# Patient Record
Sex: MALE | Race: WHITE | NOT HISPANIC OR LATINO | Employment: OTHER | URBAN - METROPOLITAN AREA
[De-identification: names, ages, dates, MRNs, and addresses within clinical notes are randomized per-mention and may not be internally consistent; named-entity substitution may affect disease eponyms.]

---

## 2019-02-13 ENCOUNTER — TRANSCRIBE ORDERS (OUTPATIENT)
Dept: ADMINISTRATIVE | Facility: HOSPITAL | Age: 39
End: 2019-02-13

## 2019-02-13 DIAGNOSIS — R74.8 ACID PHOSPHATASE ELEVATED: Primary | ICD-10-CM

## 2019-02-15 ENCOUNTER — HOSPITAL ENCOUNTER (OUTPATIENT)
Dept: ULTRASOUND IMAGING | Facility: HOSPITAL | Age: 39
Discharge: HOME | End: 2019-02-15
Admitting: INTERNAL MEDICINE

## 2019-02-15 DIAGNOSIS — R74.8 ACID PHOSPHATASE ELEVATED: ICD-10-CM

## 2019-02-15 PROCEDURE — 76705 ECHO EXAM OF ABDOMEN: CPT

## 2019-03-11 ENCOUNTER — TRANSCRIBE ORDERS (OUTPATIENT)
Dept: ADMINISTRATIVE | Facility: HOSPITAL | Age: 39
End: 2019-03-11

## 2019-03-11 DIAGNOSIS — R74.8 ELEVATED LIVER ENZYMES: Primary | ICD-10-CM

## 2019-04-29 ENCOUNTER — TELEPHONE (OUTPATIENT)
Dept: SURGERY | Facility: CLINIC | Age: 39
End: 2019-04-29

## 2019-05-02 NOTE — TELEPHONE ENCOUNTER
Help me with this one... 37 yo with heartburn for a colonsocopy no family history, no other notes I could find

## 2019-05-06 NOTE — TELEPHONE ENCOUNTER
PATIENT WAS ACTUALLY REFERRED TO US BY DR WEISS FOR EGD & C/S FOR IRON DEF ANEMIA & REFLUX. I HOPE THIS HELPS - THANKS

## 2019-05-09 ENCOUNTER — PREP FOR SURGERY (OUTPATIENT)
Dept: OTHER | Facility: HOSPITAL | Age: 39
End: 2019-05-09

## 2019-05-09 DIAGNOSIS — D50.9 IRON DEFICIENCY ANEMIA, UNSPECIFIED IRON DEFICIENCY ANEMIA TYPE: Primary | ICD-10-CM

## 2019-05-09 NOTE — TELEPHONE ENCOUNTER
Thanks that helps I scheduled in BLOU for possible APC just incase he has GAVE    Double / BLOU / ABENA

## 2019-05-13 ENCOUNTER — PREP FOR SURGERY (OUTPATIENT)
Dept: OTHER | Facility: HOSPITAL | Age: 39
End: 2019-05-13

## 2019-05-13 DIAGNOSIS — D50.9 IRON DEFICIENCY ANEMIA, UNSPECIFIED IRON DEFICIENCY ANEMIA TYPE: Primary | ICD-10-CM

## 2019-05-13 NOTE — TELEPHONE ENCOUNTER
RETURN CALL FROM PATIENT.  SCHEDULED [facility] 06/10/2019 AT 11:30AM - ARRIVE 10:30AM.  GOING TO CHECK WITH HIS WIFE AND CALL US BACK.

## 2019-06-10 ENCOUNTER — LAB REQUISITION (OUTPATIENT)
Dept: LAB | Facility: HOSPITAL | Age: 39
End: 2019-06-10

## 2019-06-10 ENCOUNTER — OUTSIDE FACILITY SERVICE (OUTPATIENT)
Dept: GASTROENTEROLOGY | Facility: CLINIC | Age: 39
End: 2019-06-10

## 2019-06-10 DIAGNOSIS — D50.9 IRON DEFICIENCY ANEMIA: ICD-10-CM

## 2019-06-10 PROCEDURE — 88305 TISSUE EXAM BY PATHOLOGIST: CPT | Performed by: INTERNAL MEDICINE

## 2019-06-10 PROCEDURE — 88313 SPECIAL STAINS GROUP 2: CPT | Performed by: INTERNAL MEDICINE

## 2019-06-10 PROCEDURE — 45380 COLONOSCOPY AND BIOPSY: CPT | Performed by: INTERNAL MEDICINE

## 2019-06-10 PROCEDURE — 43239 EGD BIOPSY SINGLE/MULTIPLE: CPT | Performed by: INTERNAL MEDICINE

## 2019-06-12 LAB
LAB AP CASE REPORT: NORMAL
LAB AP CLINICAL INFORMATION: NORMAL
LAB AP DIAGNOSIS COMMENT: NORMAL
PATH REPORT.ADDENDUM SPEC: NORMAL
PATH REPORT.FINAL DX SPEC: NORMAL
PATH REPORT.GROSS SPEC: NORMAL

## 2019-08-22 ENCOUNTER — OFFICE VISIT (OUTPATIENT)
Dept: GASTROENTEROLOGY | Facility: CLINIC | Age: 39
End: 2019-08-22

## 2019-08-22 VITALS — HEIGHT: 69 IN | WEIGHT: 190.2 LBS | BODY MASS INDEX: 28.17 KG/M2

## 2019-08-22 DIAGNOSIS — Z86.010 PERSONAL HISTORY OF COLONIC POLYPS: ICD-10-CM

## 2019-08-22 DIAGNOSIS — K21.00 GASTROESOPHAGEAL REFLUX DISEASE WITH ESOPHAGITIS: Primary | ICD-10-CM

## 2019-08-22 DIAGNOSIS — K75.81 NASH (NONALCOHOLIC STEATOHEPATITIS): ICD-10-CM

## 2019-08-22 PROCEDURE — 99213 OFFICE O/P EST LOW 20 MIN: CPT | Performed by: INTERNAL MEDICINE

## 2019-08-22 RX ORDER — CHOLECALCIFEROL (VITAMIN D3) 125 MCG
CAPSULE ORAL
COMMUNITY

## 2019-08-22 RX ORDER — PNV NO.95/FERROUS FUM/FOLIC AC 28MG-0.8MG
TABLET ORAL
COMMUNITY
End: 2021-07-28

## 2019-08-22 NOTE — PROGRESS NOTES
PATIENT INFORMATION  Iván Rankin       - 1980    CHIEF COMPLAINT  Chief Complaint   Patient presents with   • Follow-up     2 mo follow up on GERD       HISTORY OF PRESENT ILLNESS  Here for follow u of ABENA and has controlled his symptoms well and his colon did have one TA so will recheck in 5 years    Also has a history of OLIVA and no labs form r Idsgteins so will call.    Is on iron and he takes TIW and BID            REVIEW OF SYSTEMS  Review of Systems   Gastrointestinal:        Reflux   Allergic/Immunologic: Positive for environmental allergies.   All other systems reviewed and are negative.        ACTIVE PROBLEMS  There are no active problems to display for this patient.        PAST MEDICAL HISTORY  Past Medical History:   Diagnosis Date   • Colon polyp    • GERD (gastroesophageal reflux disease)    • Seasonal allergies          SURGICAL HISTORY  Past Surgical History:   Procedure Laterality Date   • COLONOSCOPY     • ENDOSCOPY     • TONSILLECTOMY     • UPPER GASTROINTESTINAL ENDOSCOPY           FAMILY HISTORY  Family History   Problem Relation Age of Onset   • Autoimmune disease Mother    • Fibromyalgia Mother    • Heart attack Father    • Thyroid cancer Maternal Grandfather    • Colon cancer Neg Hx    • Colon polyps Neg Hx    • Esophageal cancer Neg Hx          SOCIAL HISTORY  Social History     Occupational History   • Not on file   Tobacco Use   • Smoking status: Never Smoker   • Smokeless tobacco: Never Used   Substance and Sexual Activity   • Alcohol use: Yes     Comment: rarely   • Drug use: No   • Sexual activity: Defer       Debilities/Disabilities Identified: None    Emotional Behavior: Appropriate    CURRENT MEDICATIONS    Current Outpatient Medications:   •  Ferrous Sulfate (IRON) 325 (65 Fe) MG tablet, , Disp: , Rfl:   •  Lactobacillus (PROBIOTIC ACIDOPHILUS) capsule, , Disp: , Rfl:   •  loratadine (CLARITIN) 10 MG tablet, Take 10 mg by mouth daily., Disp: , Rfl:   •  Multiple  "Vitamins-Minerals (MULTIVITAL PO), Take  by mouth., Disp: , Rfl:   •  omeprazole (PriLOSEC) 40 MG capsule, Take 40 mg by mouth daily., Disp: , Rfl:     ALLERGIES  Patient has no known allergies.    VITALS  Vitals:    08/22/19 0943   Weight: 86.3 kg (190 lb 3.2 oz)   Height: 175.3 cm (69\")       LAST RESULTS   Lab Requisition on 06/10/2019   Component Date Value Ref Range Status   • Addendum 06/10/2019    Final                    Value:This result contains rich text formatting which cannot be displayed here.   • Case Report 06/10/2019    Final                    Value:Surgical Pathology Report                         Case: [id number]                                  Authorizing Provider:  Paco Nunes        Collected:           06/10/2019 11:30 AM                                 MD Eddy                                                                   Pathologist:           Remberto Sethi MD       Received:            06/10/2019 03:39 PM          Specimens:   1) - Small Intestine, 1) duodenal/ small bowel bx                                                   2) - Gastric, 2) gastric bx                                                                         3) - Esophagus, 3) esophageal bx                                                                    4) - Large Intestine, Right / Ascending Colon, 4) ascending colon polyp x2                          5) - Large Intestine, Rectum, 5) rectal polyp x2                                          • Clinical Information 06/10/2019    Final                    Value:This result contains rich text formatting which cannot be displayed here.   • Final Diagnosis 06/10/2019    Final                    Value:This result contains rich text formatting which cannot be displayed here.   • Comment 06/10/2019    Final                    Value:This result contains rich text formatting which cannot be displayed here.   • Gross Description 06/10/2019    Final                    " Value:This result contains rich text formatting which cannot be displayed here.     No results found.    PHYSICAL EXAM  Physical Exam   Constitutional: He is oriented to person, place, and time. He appears well-developed and well-nourished.   HENT:   Head: Normocephalic and atraumatic.   Eyes: Conjunctivae and EOM are normal. Pupils are equal, round, and reactive to light. No scleral icterus.   Neck: Normal range of motion. Neck supple. No thyromegaly present.   Cardiovascular: Normal rate, regular rhythm, normal heart sounds and intact distal pulses. Exam reveals no gallop.   No murmur heard.  Pulmonary/Chest: Effort normal and breath sounds normal. He has no wheezes. He has no rales.   Abdominal: Soft. Bowel sounds are normal. He exhibits no shifting dullness, no distension, no fluid wave, no abdominal bruit, no ascites and no mass. There is no hepatosplenomegaly. There is no tenderness. There is no guarding and negative Crawford's sign. Hernia confirmed negative in the ventral area.   Musculoskeletal: Normal range of motion. He exhibits no edema.   Lymphadenopathy:     He has no cervical adenopathy.   Neurological: He is alert and oriented to person, place, and time.   Skin: Skin is warm and dry. No rash noted. He is not diaphoretic. No erythema.   Psychiatric: He has a normal mood and affect. His behavior is normal.       ASSESSMENT  Diagnoses and all orders for this visit:    Gastroesophageal reflux disease with esophagitis    Personal history of colonic polyps    OLIVA (nonalcoholic steatohepatitis)    Other orders  -     Lactobacillus (PROBIOTIC ACIDOPHILUS) capsule  -     Ferrous Sulfate (IRON) 325 (65 Fe) MG tablet          PLAN  Return in about 1 year (around 8/22/2020).

## 2020-08-20 ENCOUNTER — TELEMEDICINE (OUTPATIENT)
Dept: GASTROENTEROLOGY | Facility: CLINIC | Age: 40
End: 2020-08-20

## 2020-08-20 DIAGNOSIS — K75.81 NASH (NONALCOHOLIC STEATOHEPATITIS): ICD-10-CM

## 2020-08-20 DIAGNOSIS — K21.9 GERD WITHOUT ESOPHAGITIS: Primary | ICD-10-CM

## 2020-08-20 PROCEDURE — 99213 OFFICE O/P EST LOW 20 MIN: CPT | Performed by: NURSE PRACTITIONER

## 2020-08-20 NOTE — PROGRESS NOTES
Tele visit:  Unable to complete visit using a video connection to the patient. A phone visit was used to complete this visits. You have chosen to receive care through a telephone visit. Do you consent to use a telephone visit for your medical care today? Yes    PATIENT INFORMATION  Iván Rankin       - 1980    CHIEF COMPLAINT  Chief Complaint   Patient presents with   • Follow-up     1 YR FOLLOW UP ON GERD & OLIVA       HISTORY OF PRESENT ILLNESS  Today's visit is for f/u on GERD and oliva.  EGD and colon 1 year ago showed chemical gastritis and reflux esophagitis, 1 TA so 5 yr recall on colon. About a week ago was having some acid reflux symptoms with morning burning and acid in his throat dyspepsia for several days in a row.  Discussed dose of omeprazole and that if he is having any symptoms of acid reflux.  Avoid ibuprofen or aleve use tylenol instead even with the OLIVA.  If having breakthrough of HB go up to BID dose of omeprazole as he states the other antacids do not work for him.      Discussed OLIVA and need for wt loss. He reports that his wt is 190 and this is exactly what he was last August.  His pcp rechecked iron levels in Dec and took him off the iron 6 months ago.  He is due for his annual soon and will get his labs drawn with this.   Discussed dietary sources of iron. Denies dark stools or blood and egd and colon were neg for source of bleeding. Will request Dec labs and let him know if there are any further recommendations based on his labs.  Also enc to discuss with PCP about the need for MVI with iron based on his most recent labs.                REVIEW OF SYSTEMS  Review of Systems      ACTIVE PROBLEMS  Patient Active Problem List    Diagnosis   • GERD without esophagitis [K21.9]   • OLIVA (nonalcoholic steatohepatitis) [K75.81]         PAST MEDICAL HISTORY  Past Medical History:   Diagnosis Date   • Colon polyp    • GERD (gastroesophageal reflux disease)    • Seasonal allergies           SURGICAL HISTORY  Past Surgical History:   Procedure Laterality Date   • COLONOSCOPY     • ENDOSCOPY     • TONSILLECTOMY     • UPPER GASTROINTESTINAL ENDOSCOPY           FAMILY HISTORY  Family History   Problem Relation Age of Onset   • Autoimmune disease Mother    • Fibromyalgia Mother    • Heart attack Father    • Thyroid cancer Maternal Grandfather    • Colon cancer Neg Hx    • Colon polyps Neg Hx    • Esophageal cancer Neg Hx          SOCIAL HISTORY  Social History     Occupational History   • Not on file   Tobacco Use   • Smoking status: Never Smoker   • Smokeless tobacco: Never Used   Substance and Sexual Activity   • Alcohol use: Yes     Comment: rarely   • Drug use: No   • Sexual activity: Defer         CURRENT MEDICATIONS    Current Outpatient Medications:   •  Ferrous Sulfate (IRON) 325 (65 Fe) MG tablet, , Disp: , Rfl:   •  Lactobacillus (PROBIOTIC ACIDOPHILUS) capsule, , Disp: , Rfl:   •  loratadine (CLARITIN) 10 MG tablet, Take 10 mg by mouth daily., Disp: , Rfl:   •  Multiple Vitamins-Minerals (MULTIVITAL PO), Take  by mouth., Disp: , Rfl:   •  omeprazole (PriLOSEC) 40 MG capsule, Take 40 mg by mouth daily., Disp: , Rfl:     ALLERGIES  Patient has no known allergies.    VITALS  There were no vitals filed for this visit.    LAST RESULTS   Lab Requisition on 06/10/2019   Component Date Value Ref Range Status   • Addendum 06/10/2019    Final                    Value:This result contains rich text formatting which cannot be displayed here.   • Case Report 06/10/2019    Final                    Value:Surgical Pathology Report                         Case: [id number]                                  Authorizing Provider:  Paco Nunes        Collected:           06/10/2019 11:30 AM                                 MD Eddy                                                                   Pathologist:           Remberto Sethi MD       Received:            06/10/2019 03:39 PM         "  Specimens:   1) - Small Intestine, 1) duodenal/ small bowel bx                                                   2) - Gastric, 2) gastric bx                                                                         3) - Esophagus, 3) esophageal bx                                                                    4) - Large Intestine, Right / Ascending Colon, 4) ascending colon polyp x2                          5) - Large Intestine, Rectum, 5) rectal polyp x2                                          • Clinical Information 06/10/2019    Final                    Value:This result contains rich text formatting which cannot be displayed here.   • Final Diagnosis 06/10/2019    Final                    Value:This result contains rich text formatting which cannot be displayed here.   • Comment 06/10/2019    Final                    Value:This result contains rich text formatting which cannot be displayed here.   • Gross Description 06/10/2019    Final                    Value:This result contains rich text formatting which cannot be displayed here.     No results found.    PHYSICAL EXAM  Debilities/Disabilities Identified: None  Emotional Behavior: Appropriate  Wt Readings from Last 3 Encounters:   08/22/19 86.3 kg (190 lb 3.2 oz)   03/17/16 86.2 kg (190 lb)     Ht Readings from Last 1 Encounters:   08/22/19 175.3 cm (69\")     There is no height or weight on file to calculate BMI.  Physical Exam  As noted above d/t televisit today  CLINICAL DATA REVIEWED   reviewed previous lab results and integrated with today's visit, reviewed notes from other physicians and/or last GI encounter, reviewed previous endoscopy results and available photos, reviewed surgical pathology results from previous biopsies, requested laboratory results done by other physician, lab work from prior visit    ASSESSMENT  Diagnoses and all orders for this visit:    GERD without esophagitis    OLIVA (nonalcoholic steatohepatitis)          PLAN  Return in " about 6 months (around 2/20/2021) for Recheck.   If having breakthrough of HB go up to BID dose of omeprazole as he states the other antacids do not work for him. Will request Dec labs and let him know if there are any further recommendations based on his labs.  Also enc to discuss with PCP about the need for MVI with iron based on his most recent labs.   Enc to aim for 15 to 20lbs of wt loss by Bald[place].  He will consider this. Enc to take omeprazole 40mg bid if symptomatic and will send in a new rx for this if he needs it.  Needs a 6 month f/u to monitor OLIVA, wt loss and chronic reflux.     I have discussed the above plan with the patient.  They verbalize understanding and are in agreement with the plan.  They have been advised to contact the office for any questions, concerns, or changes related to their health.    25 min spent with the patient today via phone >15 min counseling.

## 2020-08-20 NOTE — PATIENT INSTRUCTIONS
If having breakthrough of HB go up to BID dose of omeprazole as he states the other antacids do not work for him. Will request Dec labs and let him know if there are any further recommendations based on his labs.  Also enc to discuss with PCP about the need for MVI with iron based on his most recent labs.   Enc to aim for 15 to 20lbs of wt loss by Baldemar.  He will consider this. Enc to take omeprazole 40mg bid if symptomatic and will send in a new rx for this if he needs it.  Needs a 6 month f/u to monitor OLIVA, wt loss and chronic reflux.     Don't eat late, don't eat fried and don't eat too much at one time. Avoid tomato based products like pizza, lasagna, spagetti.  If you want to have these take an over the counter Pepcid immediately before you eat them.  Do not eat within 3-4 hrs of bedtime. If reflux is severe elevate the head of the bed. You may also use TUMS, maalox, or mylanta for breakthrough heartburn.    Take a daily probiotic for your gut as well.    Drink lots of water, eat a high fiber diet with 25-30gm a day(check the fiber content in the foods you eat), and get regular exercise.

## 2021-03-11 ENCOUNTER — IMMUNIZATION (OUTPATIENT)
Dept: VACCINE CLINIC | Facility: HOSPITAL | Age: 41
End: 2021-03-11

## 2021-03-11 PROCEDURE — 91300 HC SARSCOV02 VAC 30MCG/0.3ML IM: CPT | Performed by: OBSTETRICS & GYNECOLOGY

## 2021-03-11 PROCEDURE — 0001A: CPT | Performed by: OBSTETRICS & GYNECOLOGY

## 2021-03-14 RX ORDER — OMEPRAZOLE 40 MG/1
CAPSULE, DELAYED RELEASE ORAL
Qty: 90 CAPSULE | Refills: 0 | Status: SHIPPED | OUTPATIENT
Start: 2021-03-14 | End: 2021-04-26

## 2021-04-01 ENCOUNTER — IMMUNIZATION (OUTPATIENT)
Dept: VACCINE CLINIC | Facility: HOSPITAL | Age: 41
End: 2021-04-01

## 2021-04-01 PROCEDURE — 91300 HC SARSCOV02 VAC 30MCG/0.3ML IM: CPT | Performed by: OBSTETRICS & GYNECOLOGY

## 2021-04-01 PROCEDURE — 0002A: CPT | Performed by: OBSTETRICS & GYNECOLOGY

## 2021-04-26 RX ORDER — OMEPRAZOLE 40 MG/1
CAPSULE, DELAYED RELEASE ORAL
Qty: 90 CAPSULE | Refills: 0 | Status: SHIPPED | OUTPATIENT
Start: 2021-04-26 | End: 2021-06-25

## 2021-06-25 RX ORDER — OMEPRAZOLE 40 MG/1
CAPSULE, DELAYED RELEASE ORAL
Qty: 60 CAPSULE | Refills: 0 | Status: SHIPPED | OUTPATIENT
Start: 2021-06-25 | End: 2021-07-27

## 2021-07-27 RX ORDER — OMEPRAZOLE 40 MG/1
CAPSULE, DELAYED RELEASE ORAL
Qty: 60 CAPSULE | Refills: 0 | Status: SHIPPED | OUTPATIENT
Start: 2021-07-27 | End: 2021-07-28 | Stop reason: SDUPTHER

## 2021-07-28 ENCOUNTER — OFFICE VISIT (OUTPATIENT)
Dept: INTERNAL MEDICINE | Facility: CLINIC | Age: 41
End: 2021-07-28

## 2021-07-28 VITALS
WEIGHT: 188 LBS | DIASTOLIC BLOOD PRESSURE: 80 MMHG | OXYGEN SATURATION: 98 % | HEART RATE: 67 BPM | SYSTOLIC BLOOD PRESSURE: 124 MMHG | HEIGHT: 69 IN | RESPIRATION RATE: 18 BRPM | TEMPERATURE: 98 F | BODY MASS INDEX: 27.85 KG/M2

## 2021-07-28 DIAGNOSIS — Z11.4 ENCOUNTER FOR SCREENING FOR HIV: ICD-10-CM

## 2021-07-28 DIAGNOSIS — K21.9 GERD WITHOUT ESOPHAGITIS: ICD-10-CM

## 2021-07-28 DIAGNOSIS — Z00.00 WELL ADULT EXAM: ICD-10-CM

## 2021-07-28 DIAGNOSIS — E61.1 IRON DEFICIENCY: ICD-10-CM

## 2021-07-28 DIAGNOSIS — Z11.59 ENCOUNTER FOR HCV SCREENING TEST FOR LOW RISK PATIENT: ICD-10-CM

## 2021-07-28 DIAGNOSIS — K75.81 NASH (NONALCOHOLIC STEATOHEPATITIS): Primary | ICD-10-CM

## 2021-07-28 PROCEDURE — 99386 PREV VISIT NEW AGE 40-64: CPT | Performed by: INTERNAL MEDICINE

## 2021-07-28 RX ORDER — OMEPRAZOLE 40 MG/1
40 CAPSULE, DELAYED RELEASE ORAL 2 TIMES DAILY
Qty: 60 CAPSULE | Refills: 0 | Status: SHIPPED | OUTPATIENT
Start: 2021-07-28 | End: 2021-09-22

## 2021-07-28 NOTE — PROGRESS NOTES
"Chief Complaint   Patient presents with   • Annual Exam       Subjective   Iván Rankin is a 41 y.o. male.     GERD, doing well without issues, taking omeprazole without side effects       The following portions of the patient's history were reviewed and updated as appropriate: allergies, current medications, past family history, past medical history, past social history, past surgical history, and problem list.    Review of Systems      Objective   Body mass index is 27.76 kg/m².   Vitals:    07/28/21 0910   BP: 124/80   BP Location: Left arm   Patient Position: Sitting   Cuff Size: Adult   Pulse: 67   Resp: 18   Temp: 98 °F (36.7 °C)   SpO2: 98%   Weight: 85.3 kg (188 lb)   Height: 175.3 cm (69\")        Physical Exam      Current Outpatient Medications:   •  Lactobacillus (PROBIOTIC ACIDOPHILUS) capsule, , Disp: , Rfl:   •  loratadine (CLARITIN) 10 MG tablet, Take 10 mg by mouth daily., Disp: , Rfl:   •  Multiple Vitamins-Minerals (MULTIVITAL PO), Take  by mouth., Disp: , Rfl:   •  omeprazole (priLOSEC) 40 MG capsule, Take 1 capsule by mouth 2 (Two) Times a Day., Disp: 60 capsule, Rfl: 0     No results found for: CBCDIF, CMP, LIPIDINTERP, HGBA1C, TSH, ZQZY40KO, PSA, TESTOSTERONE     Health Maintenance   Topic Date Due   • TDAP/TD VACCINES (1 - Tdap) 07/13/2022 (Originally 5/22/1998)   • INFLUENZA VACCINE  10/01/2021        Immunization History   Administered Date(s) Administered   • COVID-19 (PFIZER) 03/11/2021, 04/01/2021       Assessment/Plan   Diagnoses and all orders for this visit:    1. OLIVA (nonalcoholic steatohepatitis) (Primary)  - check labs, consider vitamin e pending, working on diet, weight loss, exercise    2. GERD without esophagitis  -     omeprazole (priLOSEC) 40 MG capsule; Take 1 capsule by mouth 2 (Two) Times a Day.  Dispense: 60 capsule; Refill: 0    - previous esophagitis on EGD, taking ppi now and doing well    3. Well adult exam  -     Comprehensive Metabolic Panel  -     Lipid " Panel  -     Hemoglobin A1c  -     CBC & Differential    4. Encounter for screening for HIV  -     HIV-1 / O / 2 Ag / Antibody 4th Generation    5. Encounter for HCV screening test for low risk patient  -     Hepatitis C Antibody    6. Iron deficiency  -     Iron and TIBC  -     Ferritin    - suspected 2/2 esophagitis and colonic polyps potentially; checking labs today and may need to be back on supplementation           Well adult exam  - labs checked and evaluated    Colonoscopy - at 44, had previously in 6/11/2019 for iron deficiency, next in 2024  Prostate - at 49  Glaucoma - yearly  AAA - na  Lung cancer - na  HIV - checking  HCV - checking  DM - checking  HLD - checking  Smoking - no, never  Depression - no, [id number]  Vaccines - up to date, he will get tdap records from 2013  Falls - no issues  Alcohol Screening - no issues    Discussed mental health, sexual health, substance use, abuse, anticipatory guidance given.      No follow-ups on file.     Barak Dominguez MD  [facility] [place]  Internal Medicine and Pediatrics  Phone: [phone]  Fax: [phone]

## 2021-07-29 LAB
ALBUMIN SERPL-MCNC: 4.5 G/DL (ref 3.5–5.2)
ALBUMIN/GLOB SERPL: 1.5 G/DL
ALP SERPL-CCNC: 111 U/L (ref 39–117)
ALT SERPL-CCNC: 52 U/L (ref 1–41)
AST SERPL-CCNC: 30 U/L (ref 1–40)
BASOPHILS # BLD AUTO: 0.05 10*3/MM3 (ref 0–0.2)
BASOPHILS NFR BLD AUTO: 0.8 % (ref 0–1.5)
BILIRUB SERPL-MCNC: 0.5 MG/DL (ref 0–1.2)
BUN SERPL-MCNC: 13 MG/DL (ref 6–20)
BUN/CREAT SERPL: 13.7 (ref 7–25)
CALCIUM SERPL-MCNC: 9.8 MG/DL (ref 8.6–10.5)
CHLORIDE SERPL-SCNC: 105 MMOL/L (ref 98–107)
CHOLEST SERPL-MCNC: 244 MG/DL (ref 0–200)
CO2 SERPL-SCNC: 26.7 MMOL/L (ref 22–29)
CREAT SERPL-MCNC: 0.95 MG/DL (ref 0.76–1.27)
EOSINOPHIL # BLD AUTO: 0.12 10*3/MM3 (ref 0–0.4)
EOSINOPHIL NFR BLD AUTO: 1.9 % (ref 0.3–6.2)
ERYTHROCYTE [DISTWIDTH] IN BLOOD BY AUTOMATED COUNT: 13 % (ref 12.3–15.4)
FERRITIN SERPL-MCNC: 57.1 NG/ML (ref 30–400)
GLOBULIN SER CALC-MCNC: 3 GM/DL
GLUCOSE SERPL-MCNC: 91 MG/DL (ref 65–99)
HBA1C MFR BLD: 5.4 % (ref 4.8–5.6)
HCT VFR BLD AUTO: 48.7 % (ref 37.5–51)
HCV AB S/CO SERPL IA: <0.1 S/CO RATIO (ref 0–0.9)
HDLC SERPL-MCNC: 37 MG/DL (ref 40–60)
HGB BLD-MCNC: 16.3 G/DL (ref 13–17.7)
HIV 1+2 AB+HIV1 P24 AG SERPL QL IA: NON REACTIVE
IMM GRANULOCYTES # BLD AUTO: 0.03 10*3/MM3 (ref 0–0.05)
IMM GRANULOCYTES NFR BLD AUTO: 0.5 % (ref 0–0.5)
IRON SATN MFR SERPL: 21 % (ref 20–50)
IRON SERPL-MCNC: 89 MCG/DL (ref 59–158)
LDLC SERPL CALC-MCNC: 168 MG/DL (ref 0–100)
LYMPHOCYTES # BLD AUTO: 1.7 10*3/MM3 (ref 0.7–3.1)
LYMPHOCYTES NFR BLD AUTO: 26.9 % (ref 19.6–45.3)
MCH RBC QN AUTO: 29.2 PG (ref 26.6–33)
MCHC RBC AUTO-ENTMCNC: 33.5 G/DL (ref 31.5–35.7)
MCV RBC AUTO: 87.1 FL (ref 79–97)
MONOCYTES # BLD AUTO: 0.6 10*3/MM3 (ref 0.1–0.9)
MONOCYTES NFR BLD AUTO: 9.5 % (ref 5–12)
NEUTROPHILS # BLD AUTO: 3.82 10*3/MM3 (ref 1.7–7)
NEUTROPHILS NFR BLD AUTO: 60.4 % (ref 42.7–76)
NRBC BLD AUTO-RTO: 0 /100 WBC (ref 0–0.2)
PLATELET # BLD AUTO: 223 10*3/MM3 (ref 140–450)
POTASSIUM SERPL-SCNC: 4.4 MMOL/L (ref 3.5–5.2)
PROT SERPL-MCNC: 7.5 G/DL (ref 6–8.5)
RBC # BLD AUTO: 5.59 10*6/MM3 (ref 4.14–5.8)
SODIUM SERPL-SCNC: 142 MMOL/L (ref 136–145)
TIBC SERPL-MCNC: 427 MCG/DL
TRIGL SERPL-MCNC: 208 MG/DL (ref 0–150)
UIBC SERPL-MCNC: 338 MCG/DL (ref 112–346)
VLDLC SERPL CALC-MCNC: 39 MG/DL (ref 5–40)
WBC # BLD AUTO: 6.32 10*3/MM3 (ref 3.4–10.8)

## 2021-07-29 RX ORDER — VITAMIN E 268 MG
800 CAPSULE ORAL DAILY
Qty: 180 CAPSULE | Refills: 2 | Status: SHIPPED | OUTPATIENT
Start: 2021-07-29

## 2021-09-20 DIAGNOSIS — K21.9 GERD WITHOUT ESOPHAGITIS: ICD-10-CM

## 2021-09-22 RX ORDER — OMEPRAZOLE 40 MG/1
CAPSULE, DELAYED RELEASE ORAL
Qty: 60 CAPSULE | Refills: 0 | Status: SHIPPED | OUTPATIENT
Start: 2021-09-22 | End: 2021-10-21

## 2021-10-20 DIAGNOSIS — K21.9 GERD WITHOUT ESOPHAGITIS: ICD-10-CM

## 2021-10-20 NOTE — TELEPHONE ENCOUNTER
Rx Refill Note  Requested Prescriptions     Pending Prescriptions Disp Refills   • omeprazole (priLOSEC) 40 MG capsule [Pharmacy Med Name: Omeprazole Oral Capsule Delayed Release 40 MG] 60 capsule 0     Sig: TAKE 1 CAPSULE BY MOUTH TWO TIMES A DAY      Last office visit with prescribing clinician: 7/28/2021      Next office visit with prescribing clinician: Visit date not found            Kirill Childress MA  10/20/21, 14:15 EDT

## 2021-10-21 RX ORDER — OMEPRAZOLE 40 MG/1
CAPSULE, DELAYED RELEASE ORAL
Qty: 60 CAPSULE | Refills: 0 | Status: SHIPPED | OUTPATIENT
Start: 2021-10-21 | End: 2021-10-25

## 2021-10-24 DIAGNOSIS — K21.9 GERD WITHOUT ESOPHAGITIS: ICD-10-CM

## 2021-10-25 RX ORDER — OMEPRAZOLE 40 MG/1
CAPSULE, DELAYED RELEASE ORAL
Qty: 60 CAPSULE | Refills: 0 | Status: SHIPPED | OUTPATIENT
Start: 2021-10-25 | End: 2021-12-18

## 2021-12-18 DIAGNOSIS — K21.9 GERD WITHOUT ESOPHAGITIS: ICD-10-CM

## 2021-12-18 RX ORDER — OMEPRAZOLE 40 MG/1
CAPSULE, DELAYED RELEASE ORAL
Qty: 60 CAPSULE | Refills: 0 | Status: SHIPPED | OUTPATIENT
Start: 2021-12-18 | End: 2022-01-13

## 2022-01-13 DIAGNOSIS — K21.9 GERD WITHOUT ESOPHAGITIS: ICD-10-CM

## 2022-01-13 RX ORDER — OMEPRAZOLE 40 MG/1
CAPSULE, DELAYED RELEASE ORAL
Qty: 60 CAPSULE | Refills: 0 | Status: SHIPPED | OUTPATIENT
Start: 2022-01-13 | End: 2022-01-17

## 2022-01-13 NOTE — TELEPHONE ENCOUNTER
Rx Refill Note  Requested Prescriptions     Pending Prescriptions Disp Refills   • omeprazole (priLOSEC) 40 MG capsule [Pharmacy Med Name: Omeprazole Oral Capsule Delayed Release 40 MG] 60 capsule 0     Sig: TAKE 1 CAPSULE BY MOUTH TWO TIMES A DAY      Last office visit with prescribing clinician: Visit date not found      Next office visit with prescribing clinician: Visit date not found            Natalya Washburn MA  01/13/22, 12:11 EST

## 2022-01-17 DIAGNOSIS — K21.9 GERD WITHOUT ESOPHAGITIS: ICD-10-CM

## 2022-01-17 RX ORDER — OMEPRAZOLE 40 MG/1
CAPSULE, DELAYED RELEASE ORAL
Qty: 60 CAPSULE | Refills: 0 | Status: SHIPPED | OUTPATIENT
Start: 2022-01-17 | End: 2022-03-24

## 2022-01-17 RX ORDER — OMEPRAZOLE 40 MG/1
CAPSULE, DELAYED RELEASE ORAL
Qty: 60 CAPSULE | Refills: 0 | Status: SHIPPED | OUTPATIENT
Start: 2022-01-17 | End: 2022-01-17

## 2022-03-23 DIAGNOSIS — K21.9 GERD WITHOUT ESOPHAGITIS: ICD-10-CM

## 2022-03-23 NOTE — TELEPHONE ENCOUNTER
Rx Refill Note  Requested Prescriptions     Pending Prescriptions Disp Refills   • omeprazole (priLOSEC) 40 MG capsule [Pharmacy Med Name: Omeprazole Oral Capsule Delayed Release 40 MG] 60 capsule 0     Sig: TAKE 1 CAPSULE BY MOUTH TWO TIMES A DAY      Last office visit with prescribing clinician: 7/28/2021      Next office visit with prescribing clinician: Visit date not found            Kirill Childress MA  03/23/22, 15:11 EDT

## 2022-03-24 RX ORDER — OMEPRAZOLE 40 MG/1
CAPSULE, DELAYED RELEASE ORAL
Qty: 60 CAPSULE | Refills: 0 | Status: SHIPPED | OUTPATIENT
Start: 2022-03-24 | End: 2022-04-21

## 2022-04-21 DIAGNOSIS — K21.9 GERD WITHOUT ESOPHAGITIS: ICD-10-CM

## 2022-04-21 RX ORDER — OMEPRAZOLE 40 MG/1
CAPSULE, DELAYED RELEASE ORAL
Qty: 60 CAPSULE | Refills: 0 | Status: SHIPPED | OUTPATIENT
Start: 2022-04-21 | End: 2022-05-09

## 2022-04-21 NOTE — TELEPHONE ENCOUNTER
Rx Refill Note  Requested Prescriptions     Pending Prescriptions Disp Refills   • omeprazole (priLOSEC) 40 MG capsule [Pharmacy Med Name: Omeprazole Oral Capsule Delayed Release 40 MG] 60 capsule 0     Sig: TAKE 1 CAPSULE BY MOUTH TWO TIMES A DAY      Last office visit with prescribing clinician: 7/28/2021      Next office visit with prescribing clinician: Visit date not found            Kirill Childress MA  04/21/22, 11:06 EDT

## 2022-05-07 DIAGNOSIS — K21.9 GERD WITHOUT ESOPHAGITIS: ICD-10-CM

## 2022-05-09 RX ORDER — OMEPRAZOLE 40 MG/1
CAPSULE, DELAYED RELEASE ORAL
Qty: 60 CAPSULE | Refills: 0 | Status: SHIPPED | OUTPATIENT
Start: 2022-05-09 | End: 2022-05-25

## 2022-05-09 NOTE — TELEPHONE ENCOUNTER
Rx Refill Note  Requested Prescriptions     Pending Prescriptions Disp Refills   • omeprazole (priLOSEC) 40 MG capsule [Pharmacy Med Name: Omeprazole Oral Capsule Delayed Release 40 MG] 60 capsule 0     Sig: TAKE 1 CAPSULE BY MOUTH TWO TIMES A DAY      Last office visit with prescribing clinician: 7/28/2021      Next office visit with prescribing clinician: Visit date not found            Natalya Washburn MA  05/09/22, 07:42 EDT

## 2022-05-25 DIAGNOSIS — K21.9 GERD WITHOUT ESOPHAGITIS: ICD-10-CM

## 2022-05-25 RX ORDER — OMEPRAZOLE 40 MG/1
CAPSULE, DELAYED RELEASE ORAL
Qty: 60 CAPSULE | Refills: 0 | Status: SHIPPED | OUTPATIENT
Start: 2022-05-25 | End: 2022-06-30

## 2022-05-25 NOTE — TELEPHONE ENCOUNTER
Rx Refill Note  Requested Prescriptions     Pending Prescriptions Disp Refills   • omeprazole (priLOSEC) 40 MG capsule [Pharmacy Med Name: Omeprazole Oral Capsule Delayed Release 40 MG] 60 capsule 0     Sig: TAKE 1 CAPSULE BY MOUTH TWO TIMES A DAY      Last office visit with prescribing clinician: 7/28/2021      Next office visit with prescribing clinician: Visit date not found            Kirill Childress MA  05/25/22, 15:37 EDT

## 2022-06-28 DIAGNOSIS — K21.9 GERD WITHOUT ESOPHAGITIS: ICD-10-CM

## 2022-06-30 RX ORDER — OMEPRAZOLE 40 MG/1
CAPSULE, DELAYED RELEASE ORAL
Qty: 60 CAPSULE | Refills: 0 | Status: SHIPPED | OUTPATIENT
Start: 2022-06-30 | End: 2022-08-22

## 2022-08-03 ENCOUNTER — OFFICE VISIT (OUTPATIENT)
Dept: INTERNAL MEDICINE | Facility: CLINIC | Age: 42
End: 2022-08-03

## 2022-08-03 VITALS
SYSTOLIC BLOOD PRESSURE: 120 MMHG | WEIGHT: 194.2 LBS | TEMPERATURE: 97.8 F | OXYGEN SATURATION: 97 % | HEIGHT: 69 IN | BODY MASS INDEX: 28.76 KG/M2 | RESPIRATION RATE: 16 BRPM | HEART RATE: 102 BPM | DIASTOLIC BLOOD PRESSURE: 72 MMHG

## 2022-08-03 DIAGNOSIS — Z00.00 WELL ADULT EXAM: Primary | ICD-10-CM

## 2022-08-03 DIAGNOSIS — M79.672 LEFT FOOT PAIN: ICD-10-CM

## 2022-08-03 PROCEDURE — 99396 PREV VISIT EST AGE 40-64: CPT | Performed by: INTERNAL MEDICINE

## 2022-08-03 RX ORDER — MELOXICAM 15 MG/1
15 TABLET ORAL DAILY PRN
Qty: 30 TABLET | Refills: 2 | Status: SHIPPED | OUTPATIENT
Start: 2022-08-03 | End: 2022-09-29

## 2022-08-03 NOTE — PROGRESS NOTES
"Chief Complaint   Patient presents with   • Annual Exam   • Pain     Left foot          Subjective   Iván Rankin is a 42 y.o. male.     Has had some left ankle/foot pain; since 6/2022 has been having an ache in the bottom of his foot, radiates into his ankle; at times worse in the monirng, better with activity; no specific injury or trauma; no redness, no swelling       The following portions of the patient's history were reviewed and updated as appropriate: allergies, current medications, past family history, past medical history, past social history, past surgical history, and problem list.    Review of Systems      Objective   Body mass index is 28.68 kg/m².   Vitals:    08/03/22 1047   BP: 120/72   Pulse: 102   Resp: 16   Temp: 97.8 °F (36.6 °C)   SpO2: 97%   Weight: 88.1 kg (194 lb 3.2 oz)   Height: 175.3 cm (69\")        Physical Exam  Vitals and nursing note reviewed.   Constitutional:       General: He is not in acute distress.     Appearance: Normal appearance.   HENT:      Head: Normocephalic and atraumatic.      Right Ear: External ear normal.      Left Ear: External ear normal.      Nose: Nose normal.      Mouth/Throat:      Mouth: Mucous membranes are moist.      Pharynx: Oropharynx is clear.   Eyes:      Extraocular Movements: Extraocular movements intact.      Conjunctiva/sclera: Conjunctivae normal.      Pupils: Pupils are equal, round, and reactive to light.   Cardiovascular:      Rate and Rhythm: Normal rate and regular rhythm.      Pulses: Normal pulses.      Heart sounds: Normal heart sounds. No murmur heard.    No gallop.   Pulmonary:      Effort: Pulmonary effort is normal.      Breath sounds: Normal breath sounds.   Abdominal:      General: Abdomen is flat. Bowel sounds are normal. There is no distension.      Palpations: Abdomen is soft. There is no mass.      Tenderness: There is no abdominal tenderness.   Musculoskeletal:         General: No swelling. Normal range of motion.      " Cervical back: Normal range of motion and neck supple.   Skin:     General: Skin is warm and dry.      Findings: No rash.   Neurological:      General: No focal deficit present.      Mental Status: He is alert and oriented to person, place, and time. Mental status is at baseline.   Psychiatric:         Mood and Affect: Mood normal.         Behavior: Behavior normal.           Current Outpatient Medications:   •  Lactobacillus (PROBIOTIC ACIDOPHILUS) capsule, , Disp: , Rfl:   •  loratadine (CLARITIN) 10 MG tablet, Take 10 mg by mouth daily., Disp: , Rfl:   •  Multiple Vitamins-Minerals (MULTIVITAL PO), Take  by mouth., Disp: , Rfl:   •  omeprazole (priLOSEC) 40 MG capsule, TAKE 1 CAPSULE BY MOUTH TWO TIMES A DAY, Disp: 60 capsule, Rfl: 0  •  vitamin E (vitamin E) 400 UNIT capsule, Take 2 capsules by mouth Daily., Disp: 180 capsule, Rfl: 2  •  meloxicam (MOBIC) 15 MG tablet, Take 1 tablet by mouth Daily As Needed for Mild Pain ., Disp: 30 tablet, Rfl: 2     Lab Results   Component Value Date    HGBA1C 5.40 07/28/2021        Health Maintenance   Topic Date Due   • TDAP/TD VACCINES (1 - Tdap) Never done   • INFLUENZA VACCINE  10/01/2022        Immunization History   Administered Date(s) Administered   • COVID-19 (PFIZER) PURPLE CAP 03/11/2021, 04/01/2021, 11/03/2021       Assessment & Plan   Diagnoses and all orders for this visit:    1. Well adult exam (Primary)  -     Comprehensive Metabolic Panel  -     Lipid Panel  -     Hemoglobin A1c  -     CBC & Differential    2. Left foot pain  -     meloxicam (MOBIC) 15 MG tablet; Take 1 tablet by mouth Daily As Needed for Mild Pain .  Dispense: 30 tablet; Refill: 2    - start meloxicam; consider 2/2 plantar fasciitis, less likely achilles issues; will follow up in 1 month; dicsussed stretching, exercises, consider imaging, PT       Well adult exam  - labs checked and evaluated    Colonoscopy - at 44  Prostate - at 49  Glaucoma - yearly  AAA - na  Lung cancer - na  HIV -  neg  HCV - neg  DM - checking  HLD - checking  Smoking - no  Depression - no, [id number]  Vaccines - tdap in 2013, next in 2023  Falls - no  Alcohol Screening - no    Discussed mental health, sexual health, substance use, abuse, anticipatory guidance given.      No follow-ups on file.     Barak Dominguez MD  [facility]  Internal Medicine and Pediatrics  Phone: [phone]  Fax: [phone]

## 2022-08-04 LAB
ALBUMIN SERPL-MCNC: 4.4 G/DL (ref 4–5)
ALBUMIN/GLOB SERPL: 1.4 {RATIO} (ref 1.2–2.2)
ALP SERPL-CCNC: 100 IU/L (ref 44–121)
ALT SERPL-CCNC: 42 IU/L (ref 0–44)
AST SERPL-CCNC: 22 IU/L (ref 0–40)
BASOPHILS # BLD AUTO: 0.1 X10E3/UL (ref 0–0.2)
BASOPHILS NFR BLD AUTO: 1 %
BILIRUB SERPL-MCNC: 0.6 MG/DL (ref 0–1.2)
BUN SERPL-MCNC: 13 MG/DL (ref 6–24)
BUN/CREAT SERPL: 13 (ref 9–20)
CALCIUM SERPL-MCNC: 9.8 MG/DL (ref 8.7–10.2)
CHLORIDE SERPL-SCNC: 103 MMOL/L (ref 96–106)
CHOLEST SERPL-MCNC: 223 MG/DL (ref 100–199)
CO2 SERPL-SCNC: 25 MMOL/L (ref 20–29)
CREAT SERPL-MCNC: 0.98 MG/DL (ref 0.76–1.27)
EGFRCR SERPLBLD CKD-EPI 2021: 98 ML/MIN/1.73
EOSINOPHIL # BLD AUTO: 0.1 X10E3/UL (ref 0–0.4)
EOSINOPHIL NFR BLD AUTO: 2 %
ERYTHROCYTE [DISTWIDTH] IN BLOOD BY AUTOMATED COUNT: 12.9 % (ref 11.6–15.4)
GLOBULIN SER CALC-MCNC: 3.1 G/DL (ref 1.5–4.5)
GLUCOSE SERPL-MCNC: 92 MG/DL (ref 65–99)
HBA1C MFR BLD: 5.6 % (ref 4.8–5.6)
HCT VFR BLD AUTO: 47.8 % (ref 37.5–51)
HDLC SERPL-MCNC: 32 MG/DL
HGB BLD-MCNC: 16.3 G/DL (ref 13–17.7)
IMM GRANULOCYTES # BLD AUTO: 0 X10E3/UL (ref 0–0.1)
IMM GRANULOCYTES NFR BLD AUTO: 1 %
LDLC SERPL CALC-MCNC: 139 MG/DL (ref 0–99)
LYMPHOCYTES # BLD AUTO: 1.8 X10E3/UL (ref 0.7–3.1)
LYMPHOCYTES NFR BLD AUTO: 27 %
MCH RBC QN AUTO: 28.9 PG (ref 26.6–33)
MCHC RBC AUTO-ENTMCNC: 34.1 G/DL (ref 31.5–35.7)
MCV RBC AUTO: 85 FL (ref 79–97)
MONOCYTES # BLD AUTO: 0.6 X10E3/UL (ref 0.1–0.9)
MONOCYTES NFR BLD AUTO: 9 %
NEUTROPHILS # BLD AUTO: 4 X10E3/UL (ref 1.4–7)
NEUTROPHILS NFR BLD AUTO: 60 %
PLATELET # BLD AUTO: 221 X10E3/UL (ref 150–450)
POTASSIUM SERPL-SCNC: 4.3 MMOL/L (ref 3.5–5.2)
PROT SERPL-MCNC: 7.5 G/DL (ref 6–8.5)
RBC # BLD AUTO: 5.64 X10E6/UL (ref 4.14–5.8)
SODIUM SERPL-SCNC: 142 MMOL/L (ref 134–144)
TRIGL SERPL-MCNC: 283 MG/DL (ref 0–149)
VLDLC SERPL CALC-MCNC: 52 MG/DL (ref 5–40)
WBC # BLD AUTO: 6.6 X10E3/UL (ref 3.4–10.8)

## 2022-08-22 DIAGNOSIS — K21.9 GERD WITHOUT ESOPHAGITIS: ICD-10-CM

## 2022-08-22 RX ORDER — OMEPRAZOLE 40 MG/1
CAPSULE, DELAYED RELEASE ORAL
Qty: 60 CAPSULE | Refills: 0 | Status: SHIPPED | OUTPATIENT
Start: 2022-08-22 | End: 2022-09-26

## 2022-08-22 NOTE — TELEPHONE ENCOUNTER
Rx Refill Note  Requested Prescriptions     Pending Prescriptions Disp Refills   • omeprazole (priLOSEC) 40 MG capsule [Pharmacy Med Name: Omeprazole Oral Capsule Delayed Release 40 MG] 60 capsule 0     Sig: TAKE 1 CAPSULE BY MOUTH TWO TIMES A DAY      Last office visit with prescribing clinician: 8/3/2022      Next office visit with prescribing clinician: 11/9/2022            Kirill Childress MA  08/22/22, 09:24 EDT

## 2022-09-25 DIAGNOSIS — K21.9 GERD WITHOUT ESOPHAGITIS: ICD-10-CM

## 2022-09-26 RX ORDER — OMEPRAZOLE 40 MG/1
CAPSULE, DELAYED RELEASE ORAL
Qty: 60 CAPSULE | Refills: 0 | Status: SHIPPED | OUTPATIENT
Start: 2022-09-26 | End: 2022-11-09 | Stop reason: SDUPTHER

## 2022-09-26 NOTE — TELEPHONE ENCOUNTER
Rx Refill Note  Requested Prescriptions     Pending Prescriptions Disp Refills   • omeprazole (priLOSEC) 40 MG capsule [Pharmacy Med Name: Omeprazole Oral Capsule Delayed Release 40 MG] 60 capsule 0     Sig: TAKE 1 CAPSULE BY MOUTH TWO TIMES A DAY      Last office visit with prescribing clinician: 8/3/2022      Next office visit with prescribing clinician: 11/9/2022            Kirill Childress MA  09/26/22, 07:53 EDT

## 2022-09-29 DIAGNOSIS — M79.672 LEFT FOOT PAIN: ICD-10-CM

## 2022-09-29 RX ORDER — MELOXICAM 15 MG/1
TABLET ORAL
Qty: 30 TABLET | Refills: 0 | Status: SHIPPED | OUTPATIENT
Start: 2022-09-29 | End: 2022-11-09

## 2022-09-29 NOTE — TELEPHONE ENCOUNTER
Rx Refill Note  Requested Prescriptions     Pending Prescriptions Disp Refills   • meloxicam (MOBIC) 15 MG tablet [Pharmacy Med Name: Meloxicam Oral Tablet 15 MG] 30 tablet 0     Sig: TAKE 1 TABLET BY MOUTH EVERY DAY AS NEEDED FOR MILD PAIN      Last office visit with prescribing clinician: 8/3/2022      Next office visit with prescribing clinician: 11/9/2022            Kirill Childress MA  09/29/22, 08:42 EDT

## 2022-11-09 ENCOUNTER — OFFICE VISIT (OUTPATIENT)
Dept: INTERNAL MEDICINE | Facility: CLINIC | Age: 42
End: 2022-11-09

## 2022-11-09 VITALS
HEART RATE: 88 BPM | TEMPERATURE: 98 F | SYSTOLIC BLOOD PRESSURE: 128 MMHG | HEIGHT: 69 IN | DIASTOLIC BLOOD PRESSURE: 70 MMHG | RESPIRATION RATE: 18 BRPM | OXYGEN SATURATION: 98 % | WEIGHT: 199.2 LBS | BODY MASS INDEX: 29.51 KG/M2

## 2022-11-09 DIAGNOSIS — E78.2 MIXED HYPERLIPIDEMIA: ICD-10-CM

## 2022-11-09 DIAGNOSIS — K75.81 NASH (NONALCOHOLIC STEATOHEPATITIS): Primary | ICD-10-CM

## 2022-11-09 DIAGNOSIS — K21.9 GERD WITHOUT ESOPHAGITIS: ICD-10-CM

## 2022-11-09 LAB
ALBUMIN SERPL-MCNC: 4.6 G/DL (ref 3.5–5.2)
ALBUMIN/GLOB SERPL: 1.6 G/DL
ALP SERPL-CCNC: 101 U/L (ref 39–117)
ALT SERPL-CCNC: 41 U/L (ref 1–41)
AST SERPL-CCNC: 22 U/L (ref 1–40)
BILIRUB SERPL-MCNC: 0.4 MG/DL (ref 0–1.2)
BUN SERPL-MCNC: 12 MG/DL (ref 6–20)
BUN/CREAT SERPL: 13.2 (ref 7–25)
CALCIUM SERPL-MCNC: 9.5 MG/DL (ref 8.6–10.5)
CHLORIDE SERPL-SCNC: 100 MMOL/L (ref 98–107)
CHOLEST SERPL-MCNC: 236 MG/DL (ref 0–200)
CHOLEST/HDLC SERPL: 6.56 {RATIO}
CO2 SERPL-SCNC: 28.9 MMOL/L (ref 22–29)
CREAT SERPL-MCNC: 0.91 MG/DL (ref 0.76–1.27)
EGFRCR SERPLBLD CKD-EPI 2021: 107.2 ML/MIN/1.73
GLOBULIN SER CALC-MCNC: 2.9 GM/DL
GLUCOSE SERPL-MCNC: 88 MG/DL (ref 65–99)
HBA1C MFR BLD: 5.6 % (ref 4.8–5.6)
HDLC SERPL-MCNC: 36 MG/DL (ref 40–60)
LDLC SERPL CALC-MCNC: 138 MG/DL (ref 0–100)
POTASSIUM SERPL-SCNC: 4.5 MMOL/L (ref 3.5–5.2)
PROT SERPL-MCNC: 7.5 G/DL (ref 6–8.5)
SODIUM SERPL-SCNC: 140 MMOL/L (ref 136–145)
TRIGL SERPL-MCNC: 338 MG/DL (ref 0–150)
VLDLC SERPL CALC-MCNC: 62 MG/DL (ref 5–40)

## 2022-11-09 PROCEDURE — 99214 OFFICE O/P EST MOD 30 MIN: CPT | Performed by: INTERNAL MEDICINE

## 2022-11-09 RX ORDER — OMEPRAZOLE 40 MG/1
40 CAPSULE, DELAYED RELEASE ORAL 2 TIMES DAILY
Qty: 180 CAPSULE | Refills: 3 | Status: SHIPPED | OUTPATIENT
Start: 2022-11-09

## 2022-11-09 RX ORDER — OMEPRAZOLE 40 MG/1
40 CAPSULE, DELAYED RELEASE ORAL 2 TIMES DAILY
Qty: 60 CAPSULE | Refills: 0 | Status: CANCELLED | OUTPATIENT
Start: 2022-11-09

## 2022-11-09 NOTE — TELEPHONE ENCOUNTER
PATIENT IS REQUESTING A 90 DAY SUPPLY PLEASE       Rx Refill Note  Requested Prescriptions     Pending Prescriptions Disp Refills   • omeprazole (priLOSEC) 40 MG capsule 60 capsule 0     Sig: Take 1 capsule by mouth 2 (Two) Times a Day.      Last office visit with prescribing clinician: 11/9/2022      Next office visit with prescribing clinician: Visit date not found            Kirill Childress MA  11/09/22, 10:08 EST

## 2022-11-09 NOTE — PROGRESS NOTES
"Chief Complaint  OLIVA (Follow up ) and Heartburn (Follow up )    Subjective        Iván Rankin presents to [facility]  History of Present Illness  HLD, OLIVA, working on diet, exercise, limiting fried and fatty foods      Objective   Vital Signs:  /70   Pulse 88   Temp 98 °F (36.7 °C)   Resp 18   Ht 175.3 cm (69\")   Wt 90.4 kg (199 lb 3.2 oz)   SpO2 98%   BMI 29.42 kg/m²   Estimated body mass index is 29.42 kg/m² as calculated from the following:    Height as of this encounter: 175.3 cm (69\").    Weight as of this encounter: 90.4 kg (199 lb 3.2 oz).          Physical Exam  Constitutional:       Appearance: Normal appearance.   HENT:      Head: Normocephalic and atraumatic.      Right Ear: External ear normal.      Left Ear: External ear normal.      Nose: Nose normal.      Mouth/Throat:      Mouth: Mucous membranes are moist.   Eyes:      Extraocular Movements: Extraocular movements intact.      Conjunctiva/sclera: Conjunctivae normal.   Pulmonary:      Effort: Pulmonary effort is normal. No respiratory distress.   Musculoskeletal:         General: Normal range of motion.      Cervical back: Normal range of motion.   Neurological:      General: No focal deficit present.      Mental Status: He is alert. Mental status is at baseline.   Psychiatric:         Mood and Affect: Mood normal.         Behavior: Behavior normal.         Thought Content: Thought content normal.         Judgment: Judgment normal.        Result Review :                Assessment and Plan   Diagnoses and all orders for this visit:    1. OLIVA (nonalcoholic steatohepatitis) (Primary)  -     Comprehensive Metabolic Panel  -     Lipid Panel With / Chol / HDL Ratio  -     Hemoglobin A1c    2. Mixed hyperlipidemia  -     Comprehensive Metabolic Panel  -     Lipid Panel With / Chol / HDL Ratio  -     Hemoglobin A1c    - check labs as above, doing well with diet, vitamin e, no side " effects  - gerd, doing well with omeprazole, no side effects         Follow Up   No follow-ups on file.  Patient was given instructions and counseling regarding his condition or for health maintenance advice. Please see specific information pulled into the AVS if appropriate.       Answers for HPI/ROS submitted by the patient on 11/2/2022  What is the primary reason for your visit?: Other  Please describe your symptoms.: This is a followup.  Have you had these symptoms before?: Yes  How long have you been having these symptoms?: 1-4 days

## 2022-11-22 ENCOUNTER — TRANSCRIBE ORDERS (OUTPATIENT)
Dept: ADMINISTRATIVE | Facility: HOSPITAL | Age: 42
End: 2022-11-22

## 2022-11-22 DIAGNOSIS — D17.9 LIPOMA, UNSPECIFIED SITE: Primary | ICD-10-CM

## 2022-11-30 ENCOUNTER — HOSPITAL ENCOUNTER (OUTPATIENT)
Dept: CT IMAGING | Facility: HOSPITAL | Age: 42
Discharge: HOME | End: 2022-11-30
Admitting: STUDENT IN AN ORGANIZED HEALTH CARE EDUCATION/TRAINING PROGRAM

## 2022-11-30 DIAGNOSIS — D17.9 LIPOMA, UNSPECIFIED SITE: ICD-10-CM

## 2022-11-30 PROCEDURE — 73702 CT LWR EXTREMITY W/O&W/DYE: CPT

## 2022-11-30 PROCEDURE — 25010000002 IOPAMIDOL 61 % SOLUTION: Performed by: STUDENT IN AN ORGANIZED HEALTH CARE EDUCATION/TRAINING PROGRAM

## 2022-11-30 RX ADMIN — IOPAMIDOL 100 ML: 612 INJECTION, SOLUTION INTRAVENOUS at 16:10

## 2023-03-21 ENCOUNTER — TELEPHONE (OUTPATIENT)
Dept: INTERNAL MEDICINE | Facility: CLINIC | Age: 43
End: 2023-03-21
Payer: COMMERCIAL

## 2023-03-21 NOTE — TELEPHONE ENCOUNTER
Hub staff attempted to follow warm transfer process and was unsuccessful     Caller: Gillian Rankin    Relationship to patient: Emergency Contact    Best call back number: [phone] (Home)    Patient is needing: PATIENT'S WIFE WAS RETURNING ASIM CALL ABOUT RESCHEDULING THE PATIENT'S PHYSICAL FROM WHEN THE POWER WAS OUT AT THE OFFICE, HUB WAS UNABLE TO FIND A SUITABLE TIME FOR THE PATIENT'S WIFE TO RESCHEDULE THE PATIENT'S APPT, HUB WAS ALSO UNABLE TO WARM TRANSFER THE CALL, PLEASE CALL PATIENT'S WIFE BACK REGARDING THIS ASAP

## 2023-04-07 ENCOUNTER — OFFICE VISIT (OUTPATIENT)
Dept: INTERNAL MEDICINE | Facility: CLINIC | Age: 43
End: 2023-04-07
Payer: COMMERCIAL

## 2023-04-07 VITALS
TEMPERATURE: 97.8 F | BODY MASS INDEX: 30.21 KG/M2 | DIASTOLIC BLOOD PRESSURE: 76 MMHG | WEIGHT: 204 LBS | OXYGEN SATURATION: 98 % | HEART RATE: 107 BPM | HEIGHT: 69 IN | SYSTOLIC BLOOD PRESSURE: 118 MMHG

## 2023-04-07 DIAGNOSIS — Z00.00 WELL ADULT EXAM: Primary | ICD-10-CM

## 2023-04-07 DIAGNOSIS — K21.9 GERD WITHOUT ESOPHAGITIS: ICD-10-CM

## 2023-04-07 DIAGNOSIS — K75.81 NASH (NONALCOHOLIC STEATOHEPATITIS): ICD-10-CM

## 2023-04-07 RX ORDER — SEMAGLUTIDE 0.25 MG/.5ML
0.25 INJECTION, SOLUTION SUBCUTANEOUS WEEKLY
Qty: 2 ML | Refills: 2 | Status: SHIPPED | OUTPATIENT
Start: 2023-04-07

## 2023-04-07 NOTE — PROGRESS NOTES
"Chief Complaint   Patient presents with   • Annual Exam       Subjective   Iván Rankin is a 42 y.o. male.     History of Present Illness  Well adult exam, doing well       The following portions of the patient's history were reviewed and updated as appropriate: allergies, current medications, past family history, past medical history, past social history, past surgical history, and problem list.    Review of Systems      Objective   Body mass index is 30.13 kg/m².   Vitals:    04/07/23 0844   BP: 118/76   Pulse: 107   Temp: 97.8 °F (36.6 °C)   SpO2: 98%   Weight: 92.5 kg (204 lb)   Height: 175.3 cm (69\")          Physical Exam  Vitals and nursing note reviewed.   Constitutional:       General: He is not in acute distress.     Appearance: Normal appearance.   HENT:      Head: Normocephalic and atraumatic.      Right Ear: External ear normal.      Left Ear: External ear normal.      Nose: Nose normal.      Mouth/Throat:      Mouth: Mucous membranes are moist.      Pharynx: Oropharynx is clear.   Eyes:      Extraocular Movements: Extraocular movements intact.      Conjunctiva/sclera: Conjunctivae normal.      Pupils: Pupils are equal, round, and reactive to light.   Cardiovascular:      Rate and Rhythm: Normal rate and regular rhythm.      Pulses: Normal pulses.      Heart sounds: Normal heart sounds. No murmur heard.    No gallop.   Pulmonary:      Effort: Pulmonary effort is normal.      Breath sounds: Normal breath sounds.   Abdominal:      General: Abdomen is flat. Bowel sounds are normal. There is no distension.      Palpations: Abdomen is soft. There is no mass.      Tenderness: There is no abdominal tenderness.   Musculoskeletal:         General: No swelling. Normal range of motion.      Cervical back: Normal range of motion and neck supple.   Skin:     General: Skin is warm and dry.      Findings: No rash.   Neurological:      General: No focal deficit present.      Mental Status: He is alert and " oriented to person, place, and time. Mental status is at baseline.   Psychiatric:         Mood and Affect: Mood normal.         Behavior: Behavior normal.           Current Outpatient Medications:   •  Lactobacillus (PROBIOTIC ACIDOPHILUS) capsule, , Disp: , Rfl:   •  loratadine (CLARITIN) 10 MG tablet, Take 1 tablet by mouth Daily., Disp: , Rfl:   •  Multiple Vitamins-Minerals (MULTIVITAL PO), Take  by mouth., Disp: , Rfl:   •  omeprazole (priLOSEC) 40 MG capsule, Take 1 capsule by mouth 2 (Two) Times a Day., Disp: 180 capsule, Rfl: 3  •  vitamin E (vitamin E) 400 UNIT capsule, Take 2 capsules by mouth Daily., Disp: 180 capsule, Rfl: 2     Lab Results   Component Value Date    HGBA1C 5.60 11/09/2022        Health Maintenance   Topic Date Due   • TDAP/TD VACCINES (2 - Td or Tdap) 01/01/2023   • INFLUENZA VACCINE  08/01/2023   • ANNUAL PHYSICAL  08/03/2023   • LIPID PANEL  11/09/2023   • COLORECTAL CANCER SCREENING  06/10/2024   • HEPATITIS C SCREENING  Completed   • COVID-19 Vaccine  Completed   • Pneumococcal Vaccine 0-64  Aged Out        Immunization History   Administered Date(s) Administered   • COVID-19 (PFIZER) BIVALENT BOOSTER 12+YRS 11/08/2022   • COVID-19 (PFIZER) PURPLE CAP 03/11/2021, 04/01/2021, 11/03/2021   • Flu Vaccine Quad PF >36MO 11/08/2022   • Tdap 01/01/2013       Assessment & Plan   Diagnoses and all orders for this visit:    1. Well adult exam (Primary)  -     Comprehensive Metabolic Panel  -     Lipid Panel  -     Hemoglobin A1c  -     CBC & Differential    2. GERD without esophagitis    3. OLIVA (nonalcoholic steatohepatitis)    - continue omeprazole, no issues or side effects  - oliva, contineu vitamin e, doing well       Well adult exam  - labs checked and evaluated    Colonoscopy - 6/2019, next in 6/2024  Prostate - at 49  Glaucoma - yearly  AAA - na  Lung cancer - na  HIV - neg  HCV - neg  DM - checking  HLD - checking  Smoking - no  Depression - no, [id number]  Vaccines - counseled, discussed  returning for Td, he was not interested today  Falls - no  Alcohol Screening - no    Discussed mental health, sexual health, substance use, abuse, anticipatory guidance given.      No follow-ups on file.     Barak Dominguez MD  [facility] [place]  Internal Medicine and Pediatrics  Phone: [phone]  Fax: [phone]

## 2023-04-08 LAB
ALBUMIN SERPL-MCNC: 4.6 G/DL (ref 3.5–5.2)
ALBUMIN/GLOB SERPL: 1.6 G/DL
ALP SERPL-CCNC: 100 U/L (ref 39–117)
ALT SERPL-CCNC: 69 U/L (ref 1–41)
AST SERPL-CCNC: 27 U/L (ref 1–40)
BASOPHILS # BLD AUTO: 0.04 10*3/MM3 (ref 0–0.2)
BASOPHILS NFR BLD AUTO: 0.6 % (ref 0–1.5)
BILIRUB SERPL-MCNC: 0.5 MG/DL (ref 0–1.2)
BUN SERPL-MCNC: 12 MG/DL (ref 6–20)
BUN/CREAT SERPL: 11.9 (ref 7–25)
CALCIUM SERPL-MCNC: 10.1 MG/DL (ref 8.6–10.5)
CHLORIDE SERPL-SCNC: 104 MMOL/L (ref 98–107)
CHOLEST SERPL-MCNC: 246 MG/DL (ref 0–200)
CO2 SERPL-SCNC: 28.1 MMOL/L (ref 22–29)
CREAT SERPL-MCNC: 1.01 MG/DL (ref 0.76–1.27)
EGFRCR SERPLBLD CKD-EPI 2021: 94.6 ML/MIN/1.73
EOSINOPHIL # BLD AUTO: 0.14 10*3/MM3 (ref 0–0.4)
EOSINOPHIL NFR BLD AUTO: 2 % (ref 0.3–6.2)
ERYTHROCYTE [DISTWIDTH] IN BLOOD BY AUTOMATED COUNT: 13 % (ref 12.3–15.4)
GLOBULIN SER CALC-MCNC: 2.8 GM/DL
GLUCOSE SERPL-MCNC: 96 MG/DL (ref 65–99)
HBA1C MFR BLD: 5.6 % (ref 4.8–5.6)
HCT VFR BLD AUTO: 46.6 % (ref 37.5–51)
HDLC SERPL-MCNC: 34 MG/DL (ref 40–60)
HGB BLD-MCNC: 15.9 G/DL (ref 13–17.7)
IMM GRANULOCYTES # BLD AUTO: 0.02 10*3/MM3 (ref 0–0.05)
IMM GRANULOCYTES NFR BLD AUTO: 0.3 % (ref 0–0.5)
LDLC SERPL CALC-MCNC: 156 MG/DL (ref 0–100)
LYMPHOCYTES # BLD AUTO: 1.83 10*3/MM3 (ref 0.7–3.1)
LYMPHOCYTES NFR BLD AUTO: 26.4 % (ref 19.6–45.3)
MCH RBC QN AUTO: 29.3 PG (ref 26.6–33)
MCHC RBC AUTO-ENTMCNC: 34.1 G/DL (ref 31.5–35.7)
MCV RBC AUTO: 86 FL (ref 79–97)
MONOCYTES # BLD AUTO: 0.73 10*3/MM3 (ref 0.1–0.9)
MONOCYTES NFR BLD AUTO: 10.5 % (ref 5–12)
NEUTROPHILS # BLD AUTO: 4.16 10*3/MM3 (ref 1.7–7)
NEUTROPHILS NFR BLD AUTO: 60.2 % (ref 42.7–76)
NRBC BLD AUTO-RTO: 0 /100 WBC (ref 0–0.2)
PLATELET # BLD AUTO: 240 10*3/MM3 (ref 140–450)
POTASSIUM SERPL-SCNC: 4.1 MMOL/L (ref 3.5–5.2)
PROT SERPL-MCNC: 7.4 G/DL (ref 6–8.5)
RBC # BLD AUTO: 5.42 10*6/MM3 (ref 4.14–5.8)
SODIUM SERPL-SCNC: 140 MMOL/L (ref 136–145)
TRIGL SERPL-MCNC: 297 MG/DL (ref 0–150)
VLDLC SERPL CALC-MCNC: 56 MG/DL (ref 5–40)
WBC # BLD AUTO: 6.92 10*3/MM3 (ref 3.4–10.8)

## 2023-11-12 DIAGNOSIS — K21.9 GERD WITHOUT ESOPHAGITIS: ICD-10-CM

## 2023-11-13 RX ORDER — OMEPRAZOLE 40 MG/1
40 CAPSULE, DELAYED RELEASE ORAL 2 TIMES DAILY
Qty: 180 CAPSULE | Refills: 0 | Status: SHIPPED | OUTPATIENT
Start: 2023-11-13

## 2023-11-27 DIAGNOSIS — M25.522 LEFT ELBOW PAIN: Primary | ICD-10-CM

## 2023-12-06 ENCOUNTER — TELEPHONE (OUTPATIENT)
Dept: INTERNAL MEDICINE | Facility: CLINIC | Age: 43
End: 2023-12-06
Payer: COMMERCIAL

## 2023-12-06 NOTE — TELEPHONE ENCOUNTER
Patients wife called in about patients xray he got done, Dr. Dominguez left them a vm last week and wife is now wanting someone to call with results. Can this be done?  Thanks

## 2023-12-07 NOTE — TELEPHONE ENCOUNTER
Patient advised as instructed.  Advised pt that he would need to make an appt to establish with another provider so they could speak to the patient and get a history as to what has been going on.  Pt said he would think about what he would like to do and call us if needed.

## 2023-12-27 ENCOUNTER — OFFICE VISIT (OUTPATIENT)
Dept: ORTHOPEDIC SURGERY | Facility: CLINIC | Age: 43
End: 2023-12-27
Payer: COMMERCIAL

## 2023-12-27 VITALS
SYSTOLIC BLOOD PRESSURE: 140 MMHG | HEART RATE: 93 BPM | WEIGHT: 195.2 LBS | HEIGHT: 69 IN | BODY MASS INDEX: 28.91 KG/M2 | DIASTOLIC BLOOD PRESSURE: 87 MMHG

## 2023-12-27 DIAGNOSIS — M25.729 OLECRANON BONE SPUR: Primary | ICD-10-CM

## 2023-12-27 DIAGNOSIS — M70.22 OLECRANON BURSITIS, LEFT ELBOW: ICD-10-CM

## 2023-12-27 RX ORDER — LIDOCAINE HYDROCHLORIDE 10 MG/ML
1 INJECTION, SOLUTION EPIDURAL; INFILTRATION; INTRACAUDAL; PERINEURAL
Status: COMPLETED | OUTPATIENT
Start: 2023-12-27 | End: 2023-12-27

## 2023-12-27 RX ORDER — TRIAMCINOLONE ACETONIDE 40 MG/ML
40 INJECTION, SUSPENSION INTRA-ARTICULAR; INTRAMUSCULAR
Status: COMPLETED | OUTPATIENT
Start: 2023-12-27 | End: 2023-12-27

## 2023-12-27 RX ADMIN — LIDOCAINE HYDROCHLORIDE 1 ML: 10 INJECTION, SOLUTION EPIDURAL; INFILTRATION; INTRACAUDAL; PERINEURAL at 13:36

## 2023-12-27 RX ADMIN — TRIAMCINOLONE ACETONIDE 40 MG: 40 INJECTION, SUSPENSION INTRA-ARTICULAR; INTRAMUSCULAR at 13:36

## 2023-12-27 NOTE — PROGRESS NOTES
"Subjective: Left elbow pain     Patient ID: Iván Rankin is a 43 y.o. male.    Chief Complaint:    History of Present Illness 43-year-old male is seen for intermittent left elbow pain has had since September.  At that time he hit his elbow on a door developing some pain and discomfort.  It was intermittent and primarily only when he rested on his elbow may put any pressure on the elbow.  He thought he  hit his \"funny bone\".  Did not seek attention as he would have a trip to [place] for [place] when he returns at persistent pain discomfort and an x-ray ordered showed a significant olecranon spur.  Unable to take anti-inflammatories on a regular basis due to GI upset.  Presents here for evaluation and treatment.       Social History     Occupational History    Not on file   Tobacco Use    Smoking status: Never     Passive exposure: Never    Smokeless tobacco: Never   Vaping Use    Vaping Use: Never used   Substance and Sexual Activity    Alcohol use: Yes     Comment: rarely    Drug use: No    Sexual activity: Yes     Partners: Female     Birth control/protection: Vasectomy      Review of Systems      Past Medical History:   Diagnosis Date    Colon polyp     GERD (gastroesophageal reflux disease)     Seasonal allergies      Past Surgical History:   Procedure Laterality Date    COLONOSCOPY      ENDOSCOPY      TONSILLECTOMY      UPPER GASTROINTESTINAL ENDOSCOPY       Family History   Problem Relation Age of Onset    Autoimmune disease Mother     Fibromyalgia Mother     Heart attack Father     Thyroid cancer Maternal Grandfather     Cancer Maternal Grandfather         Thyroid    Colon cancer Neg Hx     Colon polyps Neg Hx     Esophageal cancer Neg Hx          Objective:  Vitals:    12/27/23 1322   BP: 140/87   Pulse: 93         12/27/23  1322   Weight: 88.5 kg (195 lb 3.2 oz)     Body mass index is 28.83 kg/m².        Ortho Exam   reviewed his x-rays from the outside facility shows a significant olecranon spur in " the posterior aspect of the olecranon.  Also has some anterior ossicles noted on the lateral anterior to the elbow joint itself but they are not intra-articular.  He is alert and oriented x 3.  Answers normocephalic and sclerae clear.  Left upper extremity has no motor or sensory deficits right radial, ulnar median nerve function.  He has full range of motion of the elbow.  There is no pain with flexion extension against resistance no pronation and supination.  There is pain to palpation over the olecranon spur directly but otherwise is asymptomatic.  There is no swelling of the bursa there is no erythema there is no fluid collection.  He has good capillary refill.  Again he cannot tolerate long-term anti-inflammatories due to GI side effects.    Assessment:        1. Olecranon bone spur    2. Olecranon bursitis, left elbow           Plan: I reviewed the x-rays with the patient along with his history and physical exam.  He has a small but significant olecranon spur causing his symptoms.  Obviously the spur was there before he struck the elbow in September so hopefully we can get it to subside and the pain resolved without surgery.  After reviewing treatment options were going proceed with a cortisone injection into the bursa around the spur with 1 cc lidocaine and 1 cc Kenalog which is accomplished after sterile prepping.  Postinjection instructions given.  Told him he can inject maybe 2 x 3 at the most but if he has recurrent pain despite the injection surgical excision would be best.  Patient was in agreement.  Return to see me on an as-needed basis.  Answered all questions      Medium Joint Arthrocentesis: L olecranon bursa  Date/Time: 12/27/2023 1:36 PM  Consent given by: patient  Site marked: site marked  Timeout: Immediately prior to procedure a time out was called to verify the correct patient, procedure, equipment, support staff and site/side marked as required   Supporting Documentation  Indications: pain    Procedure Details  Location: elbow - L olecranon bursa  Preparation: Patient was prepped and draped in the usual sterile fashion  Needle size: 22 G  Approach: posterior  Medications administered: 1 mL lidocaine PF 1% 1 %; 40 mg triamcinolone acetonide 40 MG/ML  Patient tolerance: patient tolerated the procedure well with no immediate complications            Work Status:    SUSHANT query complete.    Orders:  Orders Placed This Encounter   Procedures    Medium Joint Arthrocentesis: L olecranon bursa       Medications:  No orders of the defined types were placed in this encounter.      Followup:  Return if symptoms worsen or fail to improve.          Dictated utilizing Dragon dictation

## 2023-12-28 ENCOUNTER — PATIENT ROUNDING (BHMG ONLY) (OUTPATIENT)
Dept: ORTHOPEDIC SURGERY | Facility: CLINIC | Age: 43
End: 2023-12-28
Payer: COMMERCIAL

## 2023-12-28 NOTE — PROGRESS NOTES
December 28, 2023    Hello, may I speak with Iván Rankin?    My name is Rashmi      I am  with [facility]  [address]  [place] [address] [phone].    Before we get started may I verify your date of birth? 1980    I am calling to officially welcome you to our practice and ask about your recent visit. Is this a good time to talk? yes    Tell me about your visit with us. What things went well?  Everything was great. The doctor was nice and so is the staff. I didn't have to wait very long. It was a very good experience.        We're always looking for ways to make our patients' experiences even better. Do you have recommendations on ways we may improve?  no    Overall were you satisfied with your first visit to our practice? yes       I appreciate you taking the time to speak with me today. Is there anything else I can do for you? no      Thank you, and have a great day.

## 2024-03-06 ENCOUNTER — OFFICE VISIT (OUTPATIENT)
Dept: INTERNAL MEDICINE | Facility: CLINIC | Age: 44
End: 2024-03-06
Payer: COMMERCIAL

## 2024-03-06 VITALS
BODY MASS INDEX: 29.53 KG/M2 | HEART RATE: 105 BPM | HEIGHT: 69 IN | SYSTOLIC BLOOD PRESSURE: 124 MMHG | OXYGEN SATURATION: 95 % | DIASTOLIC BLOOD PRESSURE: 86 MMHG | WEIGHT: 199.4 LBS | TEMPERATURE: 98.9 F

## 2024-03-06 DIAGNOSIS — R20.0 NUMBNESS AND TINGLING OF BOTH UPPER EXTREMITIES: ICD-10-CM

## 2024-03-06 DIAGNOSIS — Z76.89 ENCOUNTER TO ESTABLISH CARE: ICD-10-CM

## 2024-03-06 DIAGNOSIS — Z12.11 COLON CANCER SCREENING: ICD-10-CM

## 2024-03-06 DIAGNOSIS — K21.9 GERD WITHOUT ESOPHAGITIS: Primary | ICD-10-CM

## 2024-03-06 DIAGNOSIS — E61.1 LOW IRON: ICD-10-CM

## 2024-03-06 DIAGNOSIS — R20.2 NUMBNESS AND TINGLING OF BOTH UPPER EXTREMITIES: ICD-10-CM

## 2024-03-06 DIAGNOSIS — E78.2 MIXED HYPERLIPIDEMIA: ICD-10-CM

## 2024-03-06 DIAGNOSIS — J30.2 SEASONAL ALLERGIC RHINITIS, UNSPECIFIED TRIGGER: ICD-10-CM

## 2024-03-06 DIAGNOSIS — R00.0 TACHYCARDIA: ICD-10-CM

## 2024-03-06 RX ORDER — OMEPRAZOLE 40 MG/1
40 CAPSULE, DELAYED RELEASE ORAL 2 TIMES DAILY
Qty: 180 CAPSULE | Refills: 3 | Status: SHIPPED | OUTPATIENT
Start: 2024-03-06

## 2024-03-06 NOTE — PROGRESS NOTES
"Chief Complaint  Establish Care, Heartburn, and Hyperlipidemia    Subjective        Iván Rankin presents to [facility]  History of Present Illness  Iván \"Pj\" is an established patient of Barak Dominguez MD (2023 note reviewed), presenting to establish care with a new provider and for management of GERD and Hyperlipidemia.  He has mixed hyperlipidemia, GERD, OLIVA.  He has also followed with Paco Nunes MD, gastroenterology.    GERD: Has had issues throughout his life.  2020 gastroenterology note indicating increased to omeprazole, 40 mg twice daily would be appropriate if he continues to have symptoms.  Patient is now reporting good control of symptoms on this regimen.    Tachycardia: Chronic, asymptomatic.  Patient reports workup many years ago with stress test without determination of etiology.  Has tried beta-blocker but could not tolerate it.    Mixed hyperlipidemia: Identified on 2023 and prior labs.  Poor management with diet.  Family history of father with CAD.    Seasonal allergic rhinitis: Feels adequately managed with Claritin, but has occasional issues with itching eyes which is managed with Pataday 0.1%.    Iron low: History of low iron levels likely secondary to absorption and possibly PPI use.  He has been taking an iron supplement 2 times a week which prevents symptoms.    Nighttime numbness/tingling in B/L UE: Worse most recently while working hard on cabinets/wordworking projects at home.  Concerned about carpal tunnel.    ROS: Denies headaches, changes in vision, changes in hearing, sore throat, shortness of breath, palpitations, nausea/vomiting/constipation/diarrhea, abdominal pain, blood in urine or stool, leg swelling.      FH:     Medical: MGF  thyroid cancer, Mother thyroidectomy for disease, autoimmune diseases.  Father MI in 60s, CAD.      Siblings: 2- 1/2 brothers, unsure of medical status, not " "communicating with    Children: 1 daughter, healthy    Reproductive: History of MSW, monogamous with wife, no concerns of STIs today, declines testing.      Objective   Vital Signs:  /86   Pulse 105   Temp 98.9 °F (37.2 °C)   Ht 175.3 cm (69\")   Wt 90.4 kg (199 lb 6.4 oz)   SpO2 95%   BMI 29.45 kg/m²   Estimated body mass index is 29.45 kg/m² as calculated from the following:    Height as of this encounter: 175.3 cm (69\").    Weight as of this encounter: 90.4 kg (199 lb 6.4 oz).               Physical Exam  Vitals and nursing note reviewed.   Constitutional:       General: He is not in acute distress.     Appearance: Normal appearance.   HENT:      Head: Normocephalic and atraumatic.   Eyes:      Extraocular Movements: Extraocular movements intact.      Conjunctiva/sclera: Conjunctivae normal.   Cardiovascular:      Rate and Rhythm: Regular rhythm. Tachycardia present.      Pulses: Normal pulses.      Heart sounds: Normal heart sounds. No murmur heard.     No friction rub. No gallop.   Pulmonary:      Effort: Pulmonary effort is normal.      Breath sounds: Normal breath sounds. No wheezing, rhonchi or rales.   Abdominal:      General: Bowel sounds are normal. There is no distension.      Palpations: Abdomen is soft.      Tenderness: There is no abdominal tenderness. There is no right CVA tenderness, left CVA tenderness or guarding.   Musculoskeletal:      Right lower leg: No edema.      Left lower leg: No edema.      Comments: Negative tinel and phalen bilaterally.   Skin:     Capillary Refill: Capillary refill takes less than 2 seconds.   Neurological:      General: No focal deficit present.      Mental Status: He is alert. Mental status is at baseline.   Psychiatric:         Mood and Affect: Mood normal.         Behavior: Behavior normal.        Result Review :                   Assessment and Plan   Diagnoses and all orders for this visit:    1. GERD without esophagitis (Primary)  -     omeprazole " (priLOSEC) 40 MG capsule; Take 1 capsule by mouth 2 (Two) Times a Day.  Dispense: 180 capsule; Refill: 3  -     Ambulatory Referral For Screening Colonoscopy    2. Tachycardia    3. Mixed hyperlipidemia    4. Seasonal allergic rhinitis, unspecified trigger    5. Low iron    6. Numbness and tingling of both upper extremities    7. Colon cancer screening  -     Ambulatory Referral For Screening Colonoscopy    8. Encounter to establish care    GERD: Continue current omeprazole, 40 mg twice daily since patient is getting good control of symptoms on this regimen.  Referral back to GI for follow-up placed today.    Tachycardia: Chronic, asymptomatic.  Continue to monitor.    Mixed hyperlipidemia: Patient agrees to dietary discussion at next visit.    Seasonal allergic rhinitis: Continue current Pataday 0.1%.  Patient managing with Claritin, further management pending patient request..    Iron low: Also family history of anemia.  Monitor since patient is on high-dose PPI.    Nighttime numbness/tingling in B/L UE: Negative carpal tunnel exam today, although patient does report symptoms come and go depending on how hard he is working.  Likely related to overuse with his current woodworking.  Continue to monitor.       Social/Preventative:    Risks: Thyroid cancer.  Strong FH hypercholesterolemia/CAD, personal history of hyperlipidemia, chronic GERD.    AAA screen: N/A    PSA: Discuss at 40yo    Colonoscopy: Polyps 6/2019, next due 6/2024, ordered today    Vaccinations: no Tetanus since 2013, will get next time.  Up-to-date on COVID and influenza.    Eye: annually    Dental: biennually    Foot exams: n/a    Nicotine: never    Illicit drugs: none    EtOH: 3/mo    Home: Lives at home with wife, daughter, feels safe.  Parents in [place]  2- 1/2 brothers not in contact with    Work: Retired .  May work in [employer] in the future.    Social: none    Exercise: Working in shop, walks in nice weather     Diet: Admits it needs work.      Current Outpatient Medications:     Iron, Ferrous Sulfate, 325 (65 Fe) MG tablet, , Disp: , Rfl:     Lactobacillus (PROBIOTIC ACIDOPHILUS) capsule, , Disp: , Rfl:     loratadine (CLARITIN) 10 MG tablet, Take 1 tablet by mouth Daily., Disp: , Rfl:     Multiple Vitamins-Minerals (MULTIVITAL PO), Take  by mouth., Disp: , Rfl:     omeprazole (priLOSEC) 40 MG capsule, Take 1 capsule by mouth 2 (Two) Times a Day., Disp: 180 capsule, Rfl: 3    vitamin E (vitamin E) 400 UNIT capsule, Take 2 capsules by mouth Daily., Disp: 180 capsule, Rfl: 2     I spent 39 minutes caring for Iván on this date of service. This time includes time spent by me in the following activities:preparing for the visit, reviewing tests, obtaining and/or reviewing a separately obtained history, performing a medically appropriate examination and/or evaluation , counseling and educating the patient/family/caregiver, ordering medications, tests, or procedures, referring and communicating with other health care professionals , and documenting information in the medical record  Follow Up   Return in about 1 month (around 4/8/2024) for Annual physical, diet.  Patient was given instructions and counseling regarding his condition or for health maintenance advice. Please see specific information pulled into the AVS if appropriate.

## 2024-03-15 ENCOUNTER — PATIENT ROUNDING (BHMG ONLY) (OUTPATIENT)
Dept: INTERNAL MEDICINE | Facility: CLINIC | Age: 44
End: 2024-03-15
Payer: COMMERCIAL

## 2024-03-15 NOTE — PROGRESS NOTES
A My-Chart message has been sent to the patient for Patient Rounding with [facility].

## 2024-03-21 ENCOUNTER — OFFICE VISIT (OUTPATIENT)
Dept: GASTROENTEROLOGY | Facility: CLINIC | Age: 44
End: 2024-03-21
Payer: COMMERCIAL

## 2024-03-21 VITALS
BODY MASS INDEX: 30.01 KG/M2 | HEIGHT: 69 IN | DIASTOLIC BLOOD PRESSURE: 86 MMHG | SYSTOLIC BLOOD PRESSURE: 126 MMHG | WEIGHT: 202.6 LBS

## 2024-03-21 DIAGNOSIS — K21.9 GERD WITHOUT ESOPHAGITIS: ICD-10-CM

## 2024-03-21 DIAGNOSIS — Z86.010 PERSONAL HISTORY OF COLONIC POLYPS: Primary | ICD-10-CM

## 2024-03-21 DIAGNOSIS — K75.81 NASH (NONALCOHOLIC STEATOHEPATITIS): ICD-10-CM

## 2024-03-21 NOTE — PROGRESS NOTES
PATIENT INFORMATION  Iván Rankin       - 1980    CHIEF COMPLAINT  Chief Complaint   Patient presents with    Heartburn       HISTORY OF PRESENT ILLNESS    Here today for evaluation of GERD    Colon recall is this year    GERD Managed on BID omeprazole. No odynophagia, dysphagia, nausea, vomiting, abdominal pain, bloating, belching. No breakthrough symptoms. No NSAIDs or OTC antacids.    Iron supplement twice a week to prevent anemia.    OLIVA: Reviewed elevation of ALT and risks of CLD. Reviewed minimizing risks with improvement in triglycerides, healthy diet, exercise, weight loss. Up about 15 lbs from last yr.  Vit E supplement. Plans to work on weight loss.     6/10/2019 Last EGD for ABENA, positive for chemical gastropathy, negative for HP, Barretts, celiac.  6/10/2019 Last Colon with 1/4 adenomas, no family hx CRC or polyps. Mother with fibromyalgia and sjrogens. Reviewed updated    Heartburn      REVIEWED PERTINENT RESULTS/ LABS  Lab Results   Component Value Date    CASEREPORT  06/10/2019     Surgical Pathology Report                         Case: [id number]                                  Authorizing Provider:  Paco Nunes        Collected:           06/10/2019 11:30 AM                                 MD Eddy                                                                   Pathologist:           Remberto Sethi MD       Received:            06/10/2019 03:39 PM          Specimens:   1) - Small Intestine, 1) duodenal/ small bowel bx                                                   2) - Gastric, 2) gastric bx                                                                         3) - Esophagus, 3) esophageal bx                                                                    4) - Large Intestine, Right / Ascending Colon, 4) ascending colon polyp x2                          5) - Large Intestine, Rectum, 5) rectal polyp x2                                           FINALDX   06/10/2019     1.  Duodenal Biopsy:  Benign duodenal mucosal with   A. Normal villous architecture.   B. No active cryptitis, crypt abscess formation, granulomatous inflammation nor         intestinal parasites identified.      2.  Gastric Biopsy:   Benign gastric mucosa with     A.  Minimal chronic inflammation with focal mucosal alteration noted suggesting developing chemical         gastropathy.     B.  No Helicobacter like organisms present by routine staining.   C.  Focal reactive change noted, no dysplasia nor malignancy identified.    3.  Esophagus Biopsy:  Benign squamous and glandular mucosa with   A.  Mild chronic inflammation with rare eosinophils noted suggesting chronic reflux.   B.  Focally suspicious for rare intestinal metaplasia by routine staining (see comment).    C.  No dysplasia nor malignancy identified.     4.  Ascending Colon Polyp x2, Biopsy:    A.  Fragments of traditional serrated adenoma.    B:  Focal prominent lymphoid aggregate noted.    C.  No high grade glandular dysplasia nor malignancy identified.    5.  Rectal Polyp x2:     A.  Fragments of hyperplastic polyp.   B.  No glandular dysplasia nor malignancy identified.     jat/brb          Lab Results   Component Value Date    HGB 15.9 04/07/2023    MCV 86.0 04/07/2023     04/07/2023    ALT 69 (H) 04/07/2023    AST 27 04/07/2023    HGBA1C 5.60 04/07/2023    TRIG 297 (H) 04/07/2023    FERRITIN 57.10 07/28/2021    TIBC 427 07/28/2021      No results found.    REVIEW OF SYSTEMS  Review of Systems   Constitutional: Negative.    HENT: Negative.     Eyes: Negative.    Respiratory: Negative.     Cardiovascular: Negative.    Gastrointestinal: Negative.         OLIVA, GERD   Endocrine: Negative.    Genitourinary: Negative.    Musculoskeletal: Negative.    Skin: Negative.    Allergic/Immunologic: Negative.    Neurological: Negative.    Hematological: Negative.    Psychiatric/Behavioral: Negative.           ACTIVE PROBLEMS  Patient Active  Problem List    Diagnosis     Mixed hyperlipidemia [E78.2]     Seasonal allergic rhinitis [J30.2]     Low iron [E61.1]     Tachycardia [R00.0]     GERD without esophagitis [K21.9]     OLIVA (nonalcoholic steatohepatitis) [K75.81]          PAST MEDICAL HISTORY  Past Medical History:   Diagnosis Date    Anemia     Colon polyp     Fatty liver     GERD (gastroesophageal reflux disease)     Hyperlipidemia     Seasonal allergies          SURGICAL HISTORY  Past Surgical History:   Procedure Laterality Date    COLONOSCOPY      ENDOSCOPY      TONSILLECTOMY      UPPER GASTROINTESTINAL ENDOSCOPY           FAMILY HISTORY  Family History   Problem Relation Age of Onset    Autoimmune disease Mother     Fibromyalgia Mother     Thyroid disease Mother         Had thyroid removed    Heart attack Father     Thyroid cancer Maternal Grandfather     Cancer Maternal Grandfather         Thyroid    Colon cancer Neg Hx     Colon polyps Neg Hx     Esophageal cancer Neg Hx          SOCIAL HISTORY  Social History     Occupational History    Not on file   Tobacco Use    Smoking status: Never     Passive exposure: Never    Smokeless tobacco: Never   Vaping Use    Vaping status: Never Used   Substance and Sexual Activity    Alcohol use: Yes     Comment: Rarely drink a mild alcoholic drink at events, etc    Drug use: No    Sexual activity: Yes     Partners: Female     Birth control/protection: Vasectomy         CURRENT MEDICATIONS    Current Outpatient Medications:     Iron, Ferrous Sulfate, 325 (65 Fe) MG tablet, , Disp: , Rfl:     Lactobacillus (PROBIOTIC ACIDOPHILUS) capsule, , Disp: , Rfl:     loratadine (CLARITIN) 10 MG tablet, Take 1 tablet by mouth Daily., Disp: , Rfl:     Multiple Vitamins-Minerals (MULTIVITAL PO), Take  by mouth., Disp: , Rfl:     omeprazole (priLOSEC) 40 MG capsule, Take 1 capsule by mouth 2 (Two) Times a Day., Disp: 180 capsule, Rfl: 3    vitamin E (vitamin E) 400 UNIT capsule, Take 2 capsules by mouth Daily., Disp: 180  "capsule, Rfl: 2    ALLERGIES  Patient has no known allergies.    VITALS  Vitals:    03/21/24 1307   BP: 126/86   BP Location: Left arm   Patient Position: Sitting   Cuff Size: Large Adult   Weight: 91.9 kg (202 lb 9.6 oz)   Height: 175.3 cm (69.02\")       PHYSICAL EXAM  Debilities/Disabilities Identified: None  Emotional Behavior: Appropriate  Wt Readings from Last 3 Encounters:   03/21/24 91.9 kg (202 lb 9.6 oz)   03/06/24 90.4 kg (199 lb 6.4 oz)   12/27/23 88.5 kg (195 lb 3.2 oz)     Ht Readings from Last 1 Encounters:   03/21/24 175.3 cm (69.02\")     Body mass index is 29.9 kg/m².  Physical Exam  Constitutional:       General: He is not in acute distress.     Appearance: Normal appearance. He is not ill-appearing.   HENT:      Head: Normocephalic and atraumatic.      Mouth/Throat:      Mouth: Mucous membranes are moist.      Pharynx: No posterior oropharyngeal erythema.   Eyes:      General: No scleral icterus.  Cardiovascular:      Rate and Rhythm: Normal rate and regular rhythm.      Heart sounds: Normal heart sounds.   Pulmonary:      Effort: Pulmonary effort is normal.      Breath sounds: Normal breath sounds.   Abdominal:      General: Abdomen is flat. Bowel sounds are normal. There is no distension.      Palpations: Abdomen is soft. There is no mass.      Tenderness: There is no abdominal tenderness. There is no guarding or rebound. Negative signs include Crawford's sign.      Hernia: No hernia is present.   Musculoskeletal:      Cervical back: Neck supple.   Skin:     General: Skin is warm.      Capillary Refill: Capillary refill takes less than 2 seconds.   Neurological:      General: No focal deficit present.      Mental Status: He is alert and oriented to person, place, and time.   Psychiatric:         Mood and Affect: Mood normal.         Behavior: Behavior normal.         Thought Content: Thought content normal.         Judgment: Judgment normal.         CLINICAL DATA REVIEWED   reviewed previous lab " results and integrated with today's visit, reviewed notes from other physicians and/or last GI encounter, reviewed previous endoscopy results and available photos, reviewed surgical pathology results from previous biopsies    ASSESSMENT  Diagnoses and all orders for this visit:    Personal history of colonic polyps  -     Case Request; Standing  -     Case Request    GERD without esophagitis    OLIVA (nonalcoholic steatohepatitis)    Other orders  -     Follow Anesthesia Guidelines / Protocol; Future  -     Verify bowel prep was successful; Standing  -     Give tap water enema if bowel prep was insufficient; Standing  -     Obtain Informed Consent; Standing          PLAN    Continue BID PPI  Schedule Colon  Weight loss and trig control. Importance of normalization of LFTs reviewed    Return in about 1 year (around 3/21/2025).    I have discussed the above plan with the patient.  They verbalize understanding and are in agreement with the plan.  They have been advised to contact the office for any questions, concerns, or changes related to their health.

## 2024-03-22 ENCOUNTER — TELEPHONE (OUTPATIENT)
Dept: GASTROENTEROLOGY | Facility: CLINIC | Age: 44
End: 2024-03-22
Payer: COMMERCIAL

## 2024-03-22 NOTE — TELEPHONE ENCOUNTER
Seen in the office yesterday, 03/21/2024 by SHAHZAD Perez.  5 year recall 06/2024.  Last colonoscopy 06/10/2019.    Scheduled at [place] 06/14/2024 at 11:30am - arrive 10:30am.  Sent prep instructions by Isabella first week 05/2024.

## 2024-05-28 ENCOUNTER — TELEPHONE (OUTPATIENT)
Dept: ORTHOPEDIC SURGERY | Facility: CLINIC | Age: 44
End: 2024-05-28
Payer: COMMERCIAL

## 2024-06-11 ENCOUNTER — TELEPHONE (OUTPATIENT)
Dept: ORTHOPEDIC SURGERY | Facility: CLINIC | Age: 44
End: 2024-06-11

## 2024-06-11 NOTE — TELEPHONE ENCOUNTER
"  Caller: Iván Rankin \"Pj\"    Relationship: Self    Best call back number: [phone]    What was the call regarding: PT WOULD LIKE TO MOVE FORWARD WITH BONE SPUR REMOVAL ON HIS ELBOW. PT WOULD LIKE TO GET THE PROCESS OF GETTING THAT PROCEDURE SCHEDULED. PLEASE CONTACT PT TO DISCUSS.       "

## 2024-06-12 ENCOUNTER — ANESTHESIA EVENT (OUTPATIENT)
Dept: PERIOP | Facility: HOSPITAL | Age: 44
End: 2024-06-12
Payer: COMMERCIAL

## 2024-06-14 ENCOUNTER — ANESTHESIA (OUTPATIENT)
Dept: PERIOP | Facility: HOSPITAL | Age: 44
End: 2024-06-14
Payer: COMMERCIAL

## 2024-06-14 ENCOUNTER — HOSPITAL ENCOUNTER (OUTPATIENT)
Facility: HOSPITAL | Age: 44
Setting detail: HOSPITAL OUTPATIENT SURGERY
Discharge: HOME | End: 2024-06-14
Attending: INTERNAL MEDICINE | Admitting: INTERNAL MEDICINE
Payer: COMMERCIAL

## 2024-06-14 VITALS
SYSTOLIC BLOOD PRESSURE: 106 MMHG | OXYGEN SATURATION: 98 % | TEMPERATURE: 97.6 F | RESPIRATION RATE: 16 BRPM | HEART RATE: 78 BPM | WEIGHT: 192.6 LBS | BODY MASS INDEX: 28.43 KG/M2 | DIASTOLIC BLOOD PRESSURE: 85 MMHG

## 2024-06-14 DIAGNOSIS — Z86.010 PERSONAL HISTORY OF COLONIC POLYPS: ICD-10-CM

## 2024-06-14 PROCEDURE — 45380 COLONOSCOPY AND BIOPSY: CPT | Performed by: INTERNAL MEDICINE

## 2024-06-14 PROCEDURE — 88305 TISSUE EXAM BY PATHOLOGIST: CPT | Performed by: INTERNAL MEDICINE

## 2024-06-14 PROCEDURE — 25010000002 PROPOFOL 200 MG/20ML EMULSION: Performed by: NURSE ANESTHETIST, CERTIFIED REGISTERED

## 2024-06-14 PROCEDURE — 25810000003 LACTATED RINGERS PER 1000 ML: Performed by: NURSE ANESTHETIST, CERTIFIED REGISTERED

## 2024-06-14 RX ORDER — SODIUM CHLORIDE 0.9 % (FLUSH) 0.9 %
10 SYRINGE (ML) INJECTION EVERY 12 HOURS SCHEDULED
Status: DISCONTINUED | OUTPATIENT
Start: 2024-06-14 | End: 2024-06-14 | Stop reason: HOSPADM

## 2024-06-14 RX ORDER — LIDOCAINE HYDROCHLORIDE 20 MG/ML
INJECTION, SOLUTION INFILTRATION; PERINEURAL AS NEEDED
Status: DISCONTINUED | OUTPATIENT
Start: 2024-06-14 | End: 2024-06-14 | Stop reason: SURG

## 2024-06-14 RX ORDER — LIDOCAINE HYDROCHLORIDE 10 MG/ML
0.5 INJECTION, SOLUTION EPIDURAL; INFILTRATION; INTRACAUDAL; PERINEURAL ONCE AS NEEDED
Status: DISCONTINUED | OUTPATIENT
Start: 2024-06-14 | End: 2024-06-14 | Stop reason: HOSPADM

## 2024-06-14 RX ORDER — SODIUM CHLORIDE 9 MG/ML
40 INJECTION, SOLUTION INTRAVENOUS AS NEEDED
Status: DISCONTINUED | OUTPATIENT
Start: 2024-06-14 | End: 2024-06-14 | Stop reason: HOSPADM

## 2024-06-14 RX ORDER — SODIUM CHLORIDE 0.9 % (FLUSH) 0.9 %
10 SYRINGE (ML) INJECTION AS NEEDED
Status: DISCONTINUED | OUTPATIENT
Start: 2024-06-14 | End: 2024-06-14 | Stop reason: HOSPADM

## 2024-06-14 RX ORDER — OLOPATADINE HYDROCHLORIDE 1 MG/ML
1 SOLUTION/ DROPS OPHTHALMIC 2 TIMES DAILY
COMMUNITY

## 2024-06-14 RX ORDER — PROPOFOL 10 MG/ML
INJECTION, EMULSION INTRAVENOUS AS NEEDED
Status: DISCONTINUED | OUTPATIENT
Start: 2024-06-14 | End: 2024-06-14 | Stop reason: SURG

## 2024-06-14 RX ORDER — SODIUM CHLORIDE, SODIUM LACTATE, POTASSIUM CHLORIDE, CALCIUM CHLORIDE 600; 310; 30; 20 MG/100ML; MG/100ML; MG/100ML; MG/100ML
9 INJECTION, SOLUTION INTRAVENOUS CONTINUOUS
Status: DISCONTINUED | OUTPATIENT
Start: 2024-06-14 | End: 2024-06-14 | Stop reason: HOSPADM

## 2024-06-14 RX ADMIN — PROPOFOL 30 MG: 10 INJECTION, EMULSION INTRAVENOUS at 11:49

## 2024-06-14 RX ADMIN — PROPOFOL 50 MG: 10 INJECTION, EMULSION INTRAVENOUS at 11:35

## 2024-06-14 RX ADMIN — PROPOFOL 100 MG: 10 INJECTION, EMULSION INTRAVENOUS at 11:45

## 2024-06-14 RX ADMIN — LIDOCAINE HYDROCHLORIDE 80 MG: 20 INJECTION, SOLUTION INFILTRATION; PERINEURAL at 11:33

## 2024-06-14 RX ADMIN — SODIUM CHLORIDE, POTASSIUM CHLORIDE, SODIUM LACTATE AND CALCIUM CHLORIDE: 600; 310; 30; 20 INJECTION, SOLUTION INTRAVENOUS at 11:29

## 2024-06-14 RX ADMIN — PROPOFOL 50 MG: 10 INJECTION, EMULSION INTRAVENOUS at 11:40

## 2024-06-14 RX ADMIN — PROPOFOL 30 MG: 10 INJECTION, EMULSION INTRAVENOUS at 11:51

## 2024-06-14 RX ADMIN — PROPOFOL 100 MG: 10 INJECTION, EMULSION INTRAVENOUS at 11:33

## 2024-06-14 NOTE — BRIEF OP NOTE
COLONOSCOPY WITH POLYPECTOMY  Progress Note    Iván Rankin  6/14/2024    Pre-op Diagnosis:   Personal history of colonic polyps [Z86.010]       Post-Op Diagnosis Codes:     * Personal history of colonic polyps [Z86.010]     * Diverticulosis [K57.90]     * Colon polyp [K63.5]    Procedure/CPT® Codes:        Procedure(s):  COLONOSCOPY WITH POLYPECTOMY              Surgeon(s):  Paco Nunes MD    Anesthesia: Monitored Anesthesia Care    Staff:   Circulator: Asael Grey RN; Denisse López RN  Scrub Person: Liseth Burns; Zoe Dubois         Estimated Blood Loss: none    Urine Voided: * No values recorded between 6/14/2024 11:29 AM and 6/14/2024 11:52 AM *    Specimens:                Specimens       ID Source Type Tests Collected By Collected At Frozen?    A Large Intestine, Right / Ascending Colon Polyp TISSUE PATHOLOGY EXAM   Paco Nunes MD 6/14/24 1143     Description: Ascending polyp x 1    B Large Intestine, Sigmoid Colon Polyp TISSUE PATHOLOGY EXAM   Paco Nunes MD 6/14/24 1149     Description: Sigmoid polyp x 1                  Drains: * No LDAs found *    Findings: Colon to TI good prep  [id number]  Mild Sigmoid Diverticulosis        Complications: none          Paco Nunes MD     Date: 6/14/2024  Time: 11:56 EDT

## 2024-06-14 NOTE — H&P
Patient Care Team:  Tish Mayes PA as PCP - General (Physician Assistant)    CHIEF COMPLAINT: Personal hx colon polyps    HISTORY OF PRESENT ILLNESS:  Last exam was 2019    Past Medical History:   Diagnosis Date    Anemia     Colon polyp     Fatty liver     GERD (gastroesophageal reflux disease)     Hyperlipidemia     Seasonal allergies      Past Surgical History:   Procedure Laterality Date    COLONOSCOPY      ENDOSCOPY      TONSILLECTOMY      UPPER GASTROINTESTINAL ENDOSCOPY       Family History   Problem Relation Age of Onset    Autoimmune disease Mother     Fibromyalgia Mother     Thyroid disease Mother         Had thyroid removed    Heart attack Father     Thyroid cancer Maternal Grandfather     Cancer Maternal Grandfather         Thyroid    Colon cancer Neg Hx     Colon polyps Neg Hx     Esophageal cancer Neg Hx      Social History     Tobacco Use    Smoking status: Never     Passive exposure: Never    Smokeless tobacco: Never   Vaping Use    Vaping status: Never Used   Substance Use Topics    Alcohol use: Yes     Comment: Rarely drink a mild alcoholic drink at events, etc    Drug use: No     Medications Prior to Admission   Medication Sig Dispense Refill Last Dose    Iron, Ferrous Sulfate, 325 (65 Fe) MG tablet    Past Week    Lactobacillus (PROBIOTIC ACIDOPHILUS) capsule    6/13/2024    loratadine (CLARITIN) 10 MG tablet Take 1 tablet by mouth Daily.   6/13/2024    Multiple Vitamins-Minerals (MULTIVITAL PO) Take  by mouth.   Past Week    olopatadine (PATANOL) 0.1 % ophthalmic solution Administer 1 drop to both eyes 2 (Two) Times a Day.   6/13/2024    omeprazole (priLOSEC) 40 MG capsule Take 1 capsule by mouth 2 (Two) Times a Day. 180 capsule 3 6/13/2024    vitamin E (vitamin E) 400 UNIT capsule Take 2 capsules by mouth Daily. 180 capsule 2 Past Week     Allergies:  Patient has no known allergies.    REVIEW OF SYSTEMS:  Please see the above history of present illness for pertinent positives and  negatives.  The remainder of the patient's systems have been reviewed and are negative.     Vital Signs  Temp:  [98.5 °F (36.9 °C)] 98.5 °F (36.9 °C)  Heart Rate:  [83] 83  Resp:  [19] 19  BP: (120)/(90) 120/90    Flowsheet Rows      Flowsheet Row First Filed Value   Admission Height --   Admission Weight 87.4 kg (192 lb 9.6 oz) Documented at 06/14/2024 1018             Physical Exam:  Physical Exam   Constitutional: Patient appears well-developed and well-nourished and in no acute distress   HEENT:   Head: Normocephalic and atraumatic.   Eyes:  Pupils are equal, round, and reactive to light. EOM are intact. Sclerae are anicteric and non-injected.  Mouth and Throat: Patient has moist mucous membranes. Oropharynx is clear of any erythema or exudate.     Neck: Neck supple. No JVD present. No thyromegaly present. No lymphadenopathy present.  Cardiovascular: Regular rate, regular rhythm, S1 normal and S2 normal.  Exam reveals no gallop and no friction rub.  No murmur heard.  Pulmonary/Chest: Lungs are clear to auscultation bilaterally. No respiratory distress. No wheezes. No rhonchi. No rales.   Abdominal: Soft. Bowel sounds are normal. No distension and no mass. There is no hepatosplenomegaly. There is no tenderness.   Musculoskeletal: Normal Muscle tone  Extremities: No edema. Pulses are palpable in all 4 extremities.  Neurological: Patient is alert and oriented to person, place, and time. Cranial nerves II-XII are grossly intact with no focal deficits.  Skin: Skin is warm. No rash noted. Nails show no clubbing.  No cyanosis or erythema.    Debilities/Disabilities Identified: None  Emotional Behavior: Appropriate     Results Review:   I reviewed the patient's new clinical results.    Lab Results (most recent)       None            Imaging Results (Most Recent)       None          reviewed    ECG/EMG Results (most recent)       None          reviewed    Assessment & Plan   Personal hx colon polyps/  colonoscopy      I  discussed the patient's findings and my recommendations with patient.     Paco Nunes MD  06/14/24  10:53 EDT    Time: 10 min prior to procedure.

## 2024-06-14 NOTE — ANESTHESIA PREPROCEDURE EVALUATION
Anesthesia Evaluation     Patient summary reviewed and Nursing notes reviewed   no history of anesthetic complications:   NPO Solid Status: > 8 hours  NPO Liquid Status: > 8 hours           Airway   Mallampati: I  TM distance: >3 FB  Neck ROM: full  No difficulty expected  Dental - normal exam     Pulmonary - negative pulmonary ROS    breath sounds clear to auscultation  Cardiovascular - normal exam  Exercise tolerance: good (4-7 METS)    Rhythm: regular  Rate: normal    (+) hyperlipidemia      Neuro/Psych- negative ROS  GI/Hepatic/Renal/Endo    (+) GERD well controlled, hepatitis, liver disease fatty liver disease    Musculoskeletal (-) negative ROS    Abdominal  - normal exam   Substance History   (+) alcohol use     OB/GYN negative ob/gyn ROS         Other - negative ROS                         Anesthesia Plan    ASA 2     MAC     intravenous induction     Anesthetic plan, risks, benefits, and alternatives have been provided, discussed and informed consent has been obtained with: patient.  Pre-procedure education provided  Use of blood products discussed with patient  Consented to blood products.        CODE STATUS:

## 2024-06-14 NOTE — ANESTHESIA POSTPROCEDURE EVALUATION
Patient: Iván Rankin    Procedure Summary       Date: 06/14/24 Room / Location: [facility] ENDOSCOPY 1 /  [facility]    Anesthesia Start: 1129 Anesthesia Stop: 1156    Procedure: COLONOSCOPY WITH POLYPECTOMY Diagnosis:       Personal history of colonic polyps      Diverticulosis      Colon polyp      (Personal history of colonic polyps [Z86.010])    Surgeons: Paco Nunes MD Provider: Colleen Esquivel MD    Anesthesia Type: MAC ASA Status: 2            Anesthesia Type: MAC    Vitals  Vitals Value Taken Time   /76 06/14/24 1220   Temp 97.6 °F (36.4 °C) 06/14/24 1200   Pulse 87 06/14/24 1224   Resp 18 06/14/24 1220   SpO2 97 % 06/14/24 1224   Vitals shown include unfiled device data.        Post Anesthesia Care and Evaluation    Patient location during evaluation: bedside  Patient participation: complete - patient participated  Level of consciousness: awake and alert  Pain score: 0  Pain management: adequate    Airway patency: patent  Anesthetic complications: No anesthetic complications  PONV Status: none  Cardiovascular status: acceptable  Respiratory status: acceptable  Hydration status: acceptable  No anesthesia care post op

## 2024-06-17 LAB
LAB AP CASE REPORT: NORMAL
PATH REPORT.FINAL DX SPEC: NORMAL
PATH REPORT.GROSS SPEC: NORMAL

## 2024-06-26 ENCOUNTER — OFFICE VISIT (OUTPATIENT)
Dept: ORTHOPEDIC SURGERY | Facility: CLINIC | Age: 44
End: 2024-06-26
Payer: COMMERCIAL

## 2024-06-26 VITALS
SYSTOLIC BLOOD PRESSURE: 134 MMHG | HEART RATE: 88 BPM | HEIGHT: 69 IN | WEIGHT: 193 LBS | DIASTOLIC BLOOD PRESSURE: 89 MMHG | BODY MASS INDEX: 28.58 KG/M2

## 2024-06-26 DIAGNOSIS — D36.12: ICD-10-CM

## 2024-06-26 DIAGNOSIS — M25.729 OLECRANON BONE SPUR: ICD-10-CM

## 2024-06-26 DIAGNOSIS — M70.22 OLECRANON BURSITIS, LEFT ELBOW: Primary | ICD-10-CM

## 2024-06-26 PROCEDURE — 99214 OFFICE O/P EST MOD 30 MIN: CPT | Performed by: ORTHOPAEDIC SURGERY

## 2024-06-26 PROCEDURE — 73080 X-RAY EXAM OF ELBOW: CPT | Performed by: ORTHOPAEDIC SURGERY

## 2024-06-26 NOTE — PROGRESS NOTES
Subjective:     Patient ID: Iván Rankin is a 44 y.o. male.    Chief Complaint:  Left elbow pain, new patient to examiner    History of Present Illness  History of Present Illness  The patient presents to clinic today for evaluation of left elbow swelling and pain.    The patient has been experiencing left elbow swelling and pain since 10/2023, which initiated following a direct blow to the posterior aspect of his elbow. Since the incident, he has experienced moderate, intermittent pain, which he now rates as mild in intensity, rated as 2 to 3 out of 10. He has received two injections, which have resulted in some improvement in the swelling and pain localized in the posterior elbow. He denies any associated numbness or tingling, although he does report a stabbing, shooting, and burning-type pain upon pressure directly over the posterior elbow. The patient is right-hand dominant. He reported a mild improvement following the injections. He consulted Dr. Sequeira, during which there was a concern for a bone spur over the posterior aspect of his elbow. He denies any fevers, chills, or sweats. He also denies any previous issues with his elbow.     Social History     Occupational History    Not on file   Tobacco Use    Smoking status: Never     Passive exposure: Never    Smokeless tobacco: Never   Vaping Use    Vaping status: Never Used   Substance and Sexual Activity    Alcohol use: Yes     Comment: Rarely drink a mild alcoholic drink at events, etc    Drug use: No    Sexual activity: Yes     Partners: Female     Birth control/protection: Vasectomy      Past Medical History:   Diagnosis Date    Anemia     Colon polyp     Fatty liver     GERD (gastroesophageal reflux disease)     Hyperlipidemia     Seasonal allergies      Past Surgical History:   Procedure Laterality Date    COLONOSCOPY      COLONOSCOPY W/ POLYPECTOMY N/A 6/14/2024    Procedure: COLONOSCOPY WITH POLYPECTOMY;  Surgeon: Paco Nunes MD;   "Location: [facility] OR;  Service: Gastroenterology;  Laterality: N/A;  Ascending polyp x 1; Sigmoid polyp x 1; Diverticulosis    ENDOSCOPY      TONSILLECTOMY      UPPER GASTROINTESTINAL ENDOSCOPY         Family History   Problem Relation Age of Onset    Autoimmune disease Mother     Fibromyalgia Mother     Thyroid disease Mother         Had thyroid removed    Heart attack Father     Thyroid cancer Maternal Grandfather     Cancer Maternal Grandfather         Thyroid    Colon cancer Neg Hx     Colon polyps Neg Hx     Esophageal cancer Neg Hx          Review of Systems        Objective:  Vitals:    06/26/24 1313   BP: 134/89   Pulse: 88   Weight: 87.5 kg (193 lb)   Height: 175.3 cm (69\")         06/26/24  1313   Weight: 87.5 kg (193 lb)     Body mass index is 28.5 kg/m².  Physical Exam    Vital signs reviewed.   General: No acute distress, alert and oriented  Eyes: conjunctiva clear; pupils equally round and reactive  ENT: external ears and nose atraumatic; oropharynx clear  CV: no peripheral edema  Resp: normal respiratory effort  Skin: no rashes or wounds; normal turgor  Psych: mood and affect appropriate; recent and remote memory intact          Physical Exam  Left elbow has an active range of motion from 0 to 150 degrees, 5 out of 5 strength on flexion and extension. Moderate tenderness with direct palpation over the posterior proximal olecranon and proximal ulna, no evidence of soft tissue swelling, no evidence of olecranon bursitis. No tenderness over triceps tendon insertion onto the region of bone prominence. Elbow is stable to varus, valgus stress at 0 and 30 degrees. Mildly positive local Tinel's over the posterior elbow with no radiation down into the mid or distal forearm.         Imaging:  Left Elbow X-Ray  Indication: Pain  Views: AP and Lateral views    Findings:  No fracture  No bony lesion  Normal soft tissues  Normal joint spaces    No prior studies were available for comparison.    Assessment:        1. " Olecranon bursitis, left elbow    2. Olecranon bone spur    3. Neuroma of left upper extremity           Plan:          Assessment & Plan  1. Left elbow swelling and pain.  Given the patient's symptomatology, the elbow pain appears to be most consistent with a local superficial neuroma. There is no evidence of olecranon bursitis, as evidenced by his x-ray results. The previously noted bone spur is relatively small, necessitating removal from the triceps insertion, which provides a greater risk than reward for him at this juncture. Options were discussed, and the patient was advised to consider a prescription topical compounded cream to use over the local region. Soft tissue massage was also recommended as much as possible. The patient concurred with this recommendation, and all his queries were addressed.    Follow-up  Follow-up visits will be scheduled on an as-needed basis if the patient experiences recurrence of symptoms or further exacerbation.    Iván GIL Chucho was in agreement with plan and had all questions answered.     Orders:  Orders Placed This Encounter   Procedures    XR Elbow 3+ View Left       Medications:  No orders of the defined types were placed in this encounter.      Followup:  Return if symptoms worsen or fail to improve.    Diagnoses and all orders for this visit:    1. Olecranon bursitis, left elbow (Primary)  -     XR Elbow 3+ View Left    2. Olecranon bone spur    3. Neuroma of left upper extremity          Dictated utilizing Dragon dictation     Patient or patient representative verbalized consent for the use of Ambient Listening during the visit with  Jose Luis Benz MD for chart documentation. 6/26/2024  13:44 EDT
